# Patient Record
Sex: MALE | Race: WHITE | NOT HISPANIC OR LATINO | Employment: OTHER | URBAN - METROPOLITAN AREA
[De-identification: names, ages, dates, MRNs, and addresses within clinical notes are randomized per-mention and may not be internally consistent; named-entity substitution may affect disease eponyms.]

---

## 2023-01-01 ENCOUNTER — NURSING HOME VISIT (OUTPATIENT)
Dept: POST ACUTE CARE | Facility: EXTERNAL LOCATION | Age: 68
End: 2023-01-01
Payer: MEDICARE

## 2023-01-01 ENCOUNTER — OFFICE VISIT (OUTPATIENT)
Dept: PRIMARY CARE | Facility: CLINIC | Age: 68
End: 2023-01-01
Payer: MEDICARE

## 2023-01-01 ENCOUNTER — TELEPHONE (OUTPATIENT)
Dept: PRIMARY CARE | Facility: CLINIC | Age: 68
End: 2023-01-01
Payer: MEDICARE

## 2023-01-01 ENCOUNTER — DOCUMENTATION (OUTPATIENT)
Dept: PRIMARY CARE | Facility: CLINIC | Age: 68
End: 2023-01-01
Payer: MEDICARE

## 2023-01-01 ENCOUNTER — PATIENT OUTREACH (OUTPATIENT)
Dept: PRIMARY CARE | Facility: CLINIC | Age: 68
End: 2023-01-01
Payer: MEDICARE

## 2023-01-01 ENCOUNTER — APPOINTMENT (OUTPATIENT)
Dept: PRIMARY CARE | Facility: CLINIC | Age: 68
End: 2023-01-01
Payer: MEDICARE

## 2023-01-01 VITALS — OXYGEN SATURATION: 94 % | HEART RATE: 88 BPM | DIASTOLIC BLOOD PRESSURE: 72 MMHG | SYSTOLIC BLOOD PRESSURE: 126 MMHG

## 2023-01-01 VITALS
WEIGHT: 266.4 LBS | SYSTOLIC BLOOD PRESSURE: 122 MMHG | HEART RATE: 73 BPM | BODY MASS INDEX: 40.51 KG/M2 | DIASTOLIC BLOOD PRESSURE: 56 MMHG | OXYGEN SATURATION: 98 %

## 2023-01-01 DIAGNOSIS — K76.82 HEPATIC ENCEPHALOPATHY (MULTI): ICD-10-CM

## 2023-01-01 DIAGNOSIS — M48.062 LUMBAR STENOSIS WITH NEUROGENIC CLAUDICATION: ICD-10-CM

## 2023-01-01 DIAGNOSIS — E11.9 TYPE 2 DIABETES MELLITUS WITHOUT COMPLICATION, WITHOUT LONG-TERM CURRENT USE OF INSULIN (MULTI): ICD-10-CM

## 2023-01-01 DIAGNOSIS — E78.2 COMBINED HYPERLIPIDEMIA: ICD-10-CM

## 2023-01-01 DIAGNOSIS — I10 ESSENTIAL HYPERTENSION, BENIGN: ICD-10-CM

## 2023-01-01 DIAGNOSIS — L29.9 PRURITUS OF SKIN: ICD-10-CM

## 2023-01-01 DIAGNOSIS — K21.9 GERD WITHOUT ESOPHAGITIS: ICD-10-CM

## 2023-01-01 DIAGNOSIS — K76.82 HEPATIC ENCEPHALOPATHY (MULTI): Primary | ICD-10-CM

## 2023-01-01 DIAGNOSIS — M15.9 GENERALIZED OSTEOARTHRITIS OF MULTIPLE SITES: ICD-10-CM

## 2023-01-01 DIAGNOSIS — G47.33 OSA (OBSTRUCTIVE SLEEP APNEA): ICD-10-CM

## 2023-01-01 DIAGNOSIS — R11.0 NAUSEA: ICD-10-CM

## 2023-01-01 DIAGNOSIS — I50.9 HEART FAILURE, UNSPECIFIED HF CHRONICITY, UNSPECIFIED HEART FAILURE TYPE (MULTI): ICD-10-CM

## 2023-01-01 DIAGNOSIS — R18.8 OTHER ASCITES: ICD-10-CM

## 2023-01-01 DIAGNOSIS — E03.9 HYPOTHYROIDISM, UNSPECIFIED TYPE: ICD-10-CM

## 2023-01-01 DIAGNOSIS — E11.8 CONTROLLED TYPE 2 DIABETES MELLITUS WITH COMPLICATION, WITHOUT LONG-TERM CURRENT USE OF INSULIN (MULTI): ICD-10-CM

## 2023-01-01 DIAGNOSIS — L29.9 PRURITUS OF SKIN: Primary | ICD-10-CM

## 2023-01-01 DIAGNOSIS — C22.0 HEPATOCELLULAR CARCINOMA (MULTI): ICD-10-CM

## 2023-01-01 DIAGNOSIS — L29.9 CHRONIC PRURITUS: ICD-10-CM

## 2023-01-01 DIAGNOSIS — K74.60 CIRRHOSIS, NONALCOHOLIC (MULTI): ICD-10-CM

## 2023-01-01 DIAGNOSIS — I50.42 CHRONIC COMBINED SYSTOLIC AND DIASTOLIC CONGESTIVE HEART FAILURE (MULTI): ICD-10-CM

## 2023-01-01 DIAGNOSIS — E11.9 TYPE 2 DIABETES MELLITUS WITHOUT COMPLICATIONS (MULTI): ICD-10-CM

## 2023-01-01 DIAGNOSIS — F32.1 CURRENT MODERATE EPISODE OF MAJOR DEPRESSIVE DISORDER, UNSPECIFIED WHETHER RECURRENT (MULTI): ICD-10-CM

## 2023-01-01 DIAGNOSIS — R07.9 CHEST PAIN, UNSPECIFIED TYPE: ICD-10-CM

## 2023-01-01 DIAGNOSIS — M62.81 MUSCLE WEAKNESS (GENERALIZED): Primary | ICD-10-CM

## 2023-01-01 DIAGNOSIS — E11.9 TYPE 2 DIABETES MELLITUS WITHOUT COMPLICATION, UNSPECIFIED WHETHER LONG TERM INSULIN USE (MULTI): ICD-10-CM

## 2023-01-01 DIAGNOSIS — E66.01 MORBID OBESITY WITH BODY MASS INDEX (BMI) OF 40.0 OR HIGHER (MULTI): ICD-10-CM

## 2023-01-01 DIAGNOSIS — N39.0 URINARY TRACT INFECTION WITHOUT HEMATURIA, SITE UNSPECIFIED: ICD-10-CM

## 2023-01-01 DIAGNOSIS — K65.2 SBP (SPONTANEOUS BACTERIAL PERITONITIS) (MULTI): ICD-10-CM

## 2023-01-01 DIAGNOSIS — L73.2 HYDRADENITIS: Primary | ICD-10-CM

## 2023-01-01 DIAGNOSIS — F32.1 MAJOR DEPRESSIVE DISORDER, SINGLE EPISODE, MODERATE (MULTI): ICD-10-CM

## 2023-01-01 DIAGNOSIS — I50.43 ACUTE ON CHRONIC COMBINED SYSTOLIC AND DIASTOLIC CONGESTIVE HEART FAILURE (MULTI): ICD-10-CM

## 2023-01-01 DIAGNOSIS — L30.4 INTERTRIGO: ICD-10-CM

## 2023-01-01 DIAGNOSIS — K65.2 SPONTANEOUS BACTERIAL PERITONITIS (MULTI): ICD-10-CM

## 2023-01-01 DIAGNOSIS — I50.42 CHRONIC COMBINED SYSTOLIC AND DIASTOLIC CONGESTIVE HEART FAILURE (MULTI): Primary | ICD-10-CM

## 2023-01-01 LAB
ALANINE AMINOTRANSFERASE (SGPT) (U/L) IN SER/PLAS: 35 U/L (ref 10–52)
ALANINE AMINOTRANSFERASE (SGPT) (U/L) IN SER/PLAS: 37 U/L (ref 10–52)
ALANINE AMINOTRANSFERASE (SGPT) (U/L) IN SER/PLAS: 43 U/L (ref 10–52)
ALANINE AMINOTRANSFERASE (SGPT) (U/L) IN SER/PLAS: 44 U/L (ref 10–52)
ALANINE AMINOTRANSFERASE (SGPT) (U/L) IN SER/PLAS: 44 U/L (ref 10–52)
ALANINE AMINOTRANSFERASE (SGPT) (U/L) IN SER/PLAS: 45 U/L (ref 10–52)
ALANINE AMINOTRANSFERASE (SGPT) (U/L) IN SER/PLAS: 46 U/L (ref 10–52)
ALANINE AMINOTRANSFERASE (SGPT) (U/L) IN SER/PLAS: 77 U/L (ref 10–52)
ALANINE AMINOTRANSFERASE (SGPT) (U/L) IN SER/PLAS: NORMAL
ALBUMIN (G/DL) IN SER/PLAS: 2.1 G/DL (ref 3.4–5)
ALBUMIN (G/DL) IN SER/PLAS: 2.2 G/DL (ref 3.4–5)
ALBUMIN (G/DL) IN SER/PLAS: 2.4 G/DL (ref 3.4–5)
ALBUMIN (G/DL) IN SER/PLAS: 2.6 G/DL (ref 3.4–5)
ALBUMIN (G/DL) IN SER/PLAS: ABNORMAL G/DL (ref 3.4–5)
ALBUMIN (G/DL) IN SER/PLAS: NORMAL
ALKALINE PHOSPHATASE (U/L) IN SER/PLAS: 179 U/L (ref 33–136)
ALKALINE PHOSPHATASE (U/L) IN SER/PLAS: 182 U/L (ref 33–136)
ALKALINE PHOSPHATASE (U/L) IN SER/PLAS: 197 U/L (ref 33–136)
ALKALINE PHOSPHATASE (U/L) IN SER/PLAS: 199 U/L (ref 33–136)
ALKALINE PHOSPHATASE (U/L) IN SER/PLAS: 230 U/L (ref 33–136)
ALKALINE PHOSPHATASE (U/L) IN SER/PLAS: 231 U/L (ref 33–136)
ALKALINE PHOSPHATASE (U/L) IN SER/PLAS: 247 U/L (ref 33–136)
ALKALINE PHOSPHATASE (U/L) IN SER/PLAS: 369 U/L (ref 33–136)
ALKALINE PHOSPHATASE (U/L) IN SER/PLAS: NORMAL
AMMONIA (UMOL/L) IN PLASMA: 123 UMOL/L
AMMONIA (UMOL/L) IN PLASMA: 147 UMOL/L
AMMONIA (UMOL/L) IN PLASMA: 298 UMOL/L
AMMONIA (UMOL/L) IN PLASMA: 60 UMOL/L
AMMONIA (UMOL/L) IN PLASMA: 79 UMOL/L
AMMONIA (UMOL/L) IN PLASMA: 84 UMOL/L
ANION GAP IN SER/PLAS: 11 MMOL/L (ref 10–20)
ANION GAP IN SER/PLAS: 13 MMOL/L (ref 10–20)
ANION GAP IN SER/PLAS: 13 MMOL/L (ref 10–20)
ANION GAP IN SER/PLAS: 15 MMOL/L (ref 10–20)
ANION GAP IN SER/PLAS: 15 MMOL/L (ref 10–20)
ANION GAP IN SER/PLAS: 16 MMOL/L (ref 10–20)
ANION GAP IN SER/PLAS: 16 MMOL/L (ref 10–20)
ANION GAP IN SER/PLAS: 18 MMOL/L (ref 10–20)
ANION GAP IN SER/PLAS: ABNORMAL MMOL/L (ref 10–20)
ANION GAP IN SER/PLAS: NORMAL
ASPARTATE AMINOTRANSFERASE (SGOT) (U/L) IN SER/PLAS: 68 U/L (ref 9–39)
ASPARTATE AMINOTRANSFERASE (SGOT) (U/L) IN SER/PLAS: 73 U/L (ref 9–39)
ASPARTATE AMINOTRANSFERASE (SGOT) (U/L) IN SER/PLAS: 75 U/L (ref 9–39)
ASPARTATE AMINOTRANSFERASE (SGOT) (U/L) IN SER/PLAS: 80 U/L (ref 9–39)
ASPARTATE AMINOTRANSFERASE (SGOT) (U/L) IN SER/PLAS: 82 U/L (ref 9–39)
ASPARTATE AMINOTRANSFERASE (SGOT) (U/L) IN SER/PLAS: 83 U/L (ref 9–39)
ASPARTATE AMINOTRANSFERASE (SGOT) (U/L) IN SER/PLAS: 88 U/L (ref 9–39)
ASPARTATE AMINOTRANSFERASE (SGOT) (U/L) IN SER/PLAS: 99 U/L (ref 9–39)
ASPARTATE AMINOTRANSFERASE (SGOT) (U/L) IN SER/PLAS: NORMAL
BASOPHILS (10*3/UL) IN BLOOD BY AUTOMATED COUNT: 0.11 X10E9/L (ref 0–0.1)
BASOPHILS (10*3/UL) IN BLOOD BY AUTOMATED COUNT: NORMAL
BASOPHILS/100 LEUKOCYTES IN BLOOD BY AUTOMATED COUNT: 1.2 % (ref 0–2)
BASOPHILS/100 LEUKOCYTES IN BLOOD BY AUTOMATED COUNT: NORMAL
BILIRUBIN DIRECT (MG/DL) IN SER/PLAS: 3.9 MG/DL (ref 0–0.3)
BILIRUBIN TOTAL (MG/DL) IN SER/PLAS: 1.1 MG/DL (ref 0–1.2)
BILIRUBIN TOTAL (MG/DL) IN SER/PLAS: 1.4 MG/DL (ref 0–1.2)
BILIRUBIN TOTAL (MG/DL) IN SER/PLAS: 5.5 MG/DL (ref 0–1.2)
BILIRUBIN TOTAL (MG/DL) IN SER/PLAS: 6.2 MG/DL (ref 0–1.2)
BILIRUBIN TOTAL (MG/DL) IN SER/PLAS: 6.4 MG/DL (ref 0–1.2)
BILIRUBIN TOTAL (MG/DL) IN SER/PLAS: 6.5 MG/DL (ref 0–1.2)
BILIRUBIN TOTAL (MG/DL) IN SER/PLAS: 6.6 MG/DL (ref 0–1.2)
BILIRUBIN TOTAL (MG/DL) IN SER/PLAS: 7 MG/DL (ref 0–1.2)
BILIRUBIN TOTAL (MG/DL) IN SER/PLAS: NORMAL
CALCIUM (MG/DL) IN SER/PLAS: 7.8 MG/DL (ref 8.6–10.3)
CALCIUM (MG/DL) IN SER/PLAS: 7.9 MG/DL (ref 8.6–10.3)
CALCIUM (MG/DL) IN SER/PLAS: 7.9 MG/DL (ref 8.6–10.3)
CALCIUM (MG/DL) IN SER/PLAS: 8 MG/DL (ref 8.6–10.3)
CALCIUM (MG/DL) IN SER/PLAS: 8 MG/DL (ref 8.6–10.3)
CALCIUM (MG/DL) IN SER/PLAS: 8.1 MG/DL (ref 8.6–10.3)
CALCIUM (MG/DL) IN SER/PLAS: 8.2 MG/DL (ref 8.6–10.3)
CALCIUM (MG/DL) IN SER/PLAS: 8.3 MG/DL (ref 8.6–10.3)
CALCIUM (MG/DL) IN SER/PLAS: ABNORMAL MG/DL (ref 8.6–10.3)
CALCIUM (MG/DL) IN SER/PLAS: NORMAL
CARBON DIOXIDE, TOTAL (MMOL/L) IN SER/PLAS: 13 MMOL/L (ref 21–32)
CARBON DIOXIDE, TOTAL (MMOL/L) IN SER/PLAS: 14 MMOL/L (ref 21–32)
CARBON DIOXIDE, TOTAL (MMOL/L) IN SER/PLAS: 16 MMOL/L (ref 21–32)
CARBON DIOXIDE, TOTAL (MMOL/L) IN SER/PLAS: 17 MMOL/L (ref 21–32)
CARBON DIOXIDE, TOTAL (MMOL/L) IN SER/PLAS: 18 MMOL/L (ref 21–32)
CARBON DIOXIDE, TOTAL (MMOL/L) IN SER/PLAS: 21 MMOL/L (ref 21–32)
CARBON DIOXIDE, TOTAL (MMOL/L) IN SER/PLAS: ABNORMAL MMOL/L (ref 21–32)
CARBON DIOXIDE, TOTAL (MMOL/L) IN SER/PLAS: NORMAL
CHLORIDE (MMOL/L) IN SER/PLAS: 101 MMOL/L (ref 98–107)
CHLORIDE (MMOL/L) IN SER/PLAS: 106 MMOL/L (ref 98–107)
CHLORIDE (MMOL/L) IN SER/PLAS: 106 MMOL/L (ref 98–107)
CHLORIDE (MMOL/L) IN SER/PLAS: 107 MMOL/L (ref 98–107)
CHLORIDE (MMOL/L) IN SER/PLAS: 107 MMOL/L (ref 98–107)
CHLORIDE (MMOL/L) IN SER/PLAS: 111 MMOL/L (ref 98–107)
CHLORIDE (MMOL/L) IN SER/PLAS: 111 MMOL/L (ref 98–107)
CHLORIDE (MMOL/L) IN SER/PLAS: 112 MMOL/L (ref 98–107)
CHLORIDE (MMOL/L) IN SER/PLAS: ABNORMAL MMOL/L (ref 98–107)
CHLORIDE (MMOL/L) IN SER/PLAS: NORMAL
CREATININE (MG/DL) IN SER/PLAS: 1.02 MG/DL (ref 0.5–1.3)
CREATININE (MG/DL) IN SER/PLAS: 1.25 MG/DL (ref 0.5–1.3)
CREATININE (MG/DL) IN SER/PLAS: 4.05 MG/DL (ref 0.5–1.3)
CREATININE (MG/DL) IN SER/PLAS: 4.44 MG/DL (ref 0.5–1.3)
CREATININE (MG/DL) IN SER/PLAS: 4.55 MG/DL (ref 0.5–1.3)
CREATININE (MG/DL) IN SER/PLAS: 4.56 MG/DL (ref 0.5–1.3)
CREATININE (MG/DL) IN SER/PLAS: 4.57 MG/DL (ref 0.5–1.3)
CREATININE (MG/DL) IN SER/PLAS: 4.63 MG/DL (ref 0.5–1.3)
CREATININE (MG/DL) IN SER/PLAS: ABNORMAL MG/DL (ref 0.5–1.3)
CREATININE (MG/DL) IN SER/PLAS: NORMAL
EOSINOPHILS (10*3/UL) IN BLOOD BY AUTOMATED COUNT: 0.33 X10E9/L (ref 0–0.7)
EOSINOPHILS (10*3/UL) IN BLOOD BY AUTOMATED COUNT: NORMAL
EOSINOPHILS/100 LEUKOCYTES IN BLOOD BY AUTOMATED COUNT: 3.7 % (ref 0–6)
EOSINOPHILS/100 LEUKOCYTES IN BLOOD BY AUTOMATED COUNT: NORMAL
ERYTHROCYTE DISTRIBUTION WIDTH (RATIO) BY AUTOMATED COUNT: 18.9 % (ref 11.5–14.5)
ERYTHROCYTE DISTRIBUTION WIDTH (RATIO) BY AUTOMATED COUNT: 19 % (ref 11.5–14.5)
ERYTHROCYTE DISTRIBUTION WIDTH (RATIO) BY AUTOMATED COUNT: 20.7 % (ref 11.5–14.5)
ERYTHROCYTE DISTRIBUTION WIDTH (RATIO) BY AUTOMATED COUNT: 23.2 % (ref 11.5–14.5)
ERYTHROCYTE DISTRIBUTION WIDTH (RATIO) BY AUTOMATED COUNT: 23.3 % (ref 11.5–14.5)
ERYTHROCYTE DISTRIBUTION WIDTH (RATIO) BY AUTOMATED COUNT: 23.4 % (ref 11.5–14.5)
ERYTHROCYTE DISTRIBUTION WIDTH (RATIO) BY AUTOMATED COUNT: 23.4 % (ref 11.5–14.5)
ERYTHROCYTE DISTRIBUTION WIDTH (RATIO) BY AUTOMATED COUNT: 23.5 % (ref 11.5–14.5)
ERYTHROCYTE DISTRIBUTION WIDTH (RATIO) BY AUTOMATED COUNT: NORMAL
ERYTHROCYTE MEAN CORPUSCULAR HEMOGLOBIN CONCENTRATION (G/DL) BY AUTOMATED: 31.2 G/DL (ref 32–36)
ERYTHROCYTE MEAN CORPUSCULAR HEMOGLOBIN CONCENTRATION (G/DL) BY AUTOMATED: 31.3 G/DL (ref 32–36)
ERYTHROCYTE MEAN CORPUSCULAR HEMOGLOBIN CONCENTRATION (G/DL) BY AUTOMATED: 31.4 G/DL (ref 32–36)
ERYTHROCYTE MEAN CORPUSCULAR HEMOGLOBIN CONCENTRATION (G/DL) BY AUTOMATED: 31.4 G/DL (ref 32–36)
ERYTHROCYTE MEAN CORPUSCULAR HEMOGLOBIN CONCENTRATION (G/DL) BY AUTOMATED: 31.6 G/DL (ref 32–36)
ERYTHROCYTE MEAN CORPUSCULAR HEMOGLOBIN CONCENTRATION (G/DL) BY AUTOMATED: 32.2 G/DL (ref 32–36)
ERYTHROCYTE MEAN CORPUSCULAR HEMOGLOBIN CONCENTRATION (G/DL) BY AUTOMATED: NORMAL
ERYTHROCYTE MEAN CORPUSCULAR VOLUME (FL) BY AUTOMATED COUNT: 85 FL (ref 80–100)
ERYTHROCYTE MEAN CORPUSCULAR VOLUME (FL) BY AUTOMATED COUNT: 88 FL (ref 80–100)
ERYTHROCYTE MEAN CORPUSCULAR VOLUME (FL) BY AUTOMATED COUNT: 89 FL (ref 80–100)
ERYTHROCYTE MEAN CORPUSCULAR VOLUME (FL) BY AUTOMATED COUNT: 90 FL (ref 80–100)
ERYTHROCYTE MEAN CORPUSCULAR VOLUME (FL) BY AUTOMATED COUNT: 91 FL (ref 80–100)
ERYTHROCYTE MEAN CORPUSCULAR VOLUME (FL) BY AUTOMATED COUNT: 91 FL (ref 80–100)
ERYTHROCYTE MEAN CORPUSCULAR VOLUME (FL) BY AUTOMATED COUNT: NORMAL
ERYTHROCYTES (10*6/UL) IN BLOOD BY AUTOMATED COUNT: 2.89 X10E12/L (ref 4.5–5.9)
ERYTHROCYTES (10*6/UL) IN BLOOD BY AUTOMATED COUNT: 2.93 X10E12/L (ref 4.5–5.9)
ERYTHROCYTES (10*6/UL) IN BLOOD BY AUTOMATED COUNT: 2.96 X10E12/L (ref 4.5–5.9)
ERYTHROCYTES (10*6/UL) IN BLOOD BY AUTOMATED COUNT: 3 X10E12/L (ref 4.5–5.9)
ERYTHROCYTES (10*6/UL) IN BLOOD BY AUTOMATED COUNT: 3.14 X10E12/L (ref 4.5–5.9)
ERYTHROCYTES (10*6/UL) IN BLOOD BY AUTOMATED COUNT: 3.14 X10E12/L (ref 4.5–5.9)
ERYTHROCYTES (10*6/UL) IN BLOOD BY AUTOMATED COUNT: 4.06 X10E12/L (ref 4.5–5.9)
ERYTHROCYTES (10*6/UL) IN BLOOD BY AUTOMATED COUNT: 4.11 X10E12/L (ref 4.5–5.9)
ERYTHROCYTES (10*6/UL) IN BLOOD BY AUTOMATED COUNT: NORMAL
GFR FEMALE: NORMAL
GFR MALE: 13 ML/MIN/1.73M2
GFR MALE: 14 ML/MIN/1.73M2
GFR MALE: 15 ML/MIN/1.73M2
GFR MALE: 62 ML/MIN/1.73M2
GFR MALE: 79 ML/MIN/1.73M2
GFR MALE: ABNORMAL ML/MIN/1.73M2
GFR MALE: NORMAL
GLUCOSE (MG/DL) IN SER/PLAS: 118 MG/DL (ref 74–99)
GLUCOSE (MG/DL) IN SER/PLAS: 169 MG/DL (ref 74–99)
GLUCOSE (MG/DL) IN SER/PLAS: 36 MG/DL (ref 74–99)
GLUCOSE (MG/DL) IN SER/PLAS: 45 MG/DL (ref 74–99)
GLUCOSE (MG/DL) IN SER/PLAS: 46 MG/DL (ref 74–99)
GLUCOSE (MG/DL) IN SER/PLAS: 48 MG/DL (ref 74–99)
GLUCOSE (MG/DL) IN SER/PLAS: 53 MG/DL (ref 74–99)
GLUCOSE (MG/DL) IN SER/PLAS: 58 MG/DL (ref 74–99)
GLUCOSE (MG/DL) IN SER/PLAS: ABNORMAL MG/DL (ref 74–99)
GLUCOSE (MG/DL) IN SER/PLAS: NORMAL
HEMATOCRIT (%) IN BLOOD BY AUTOMATED COUNT: 24.5 % (ref 41–52)
HEMATOCRIT (%) IN BLOOD BY AUTOMATED COUNT: 26.2 % (ref 41–52)
HEMATOCRIT (%) IN BLOOD BY AUTOMATED COUNT: 26.4 % (ref 41–52)
HEMATOCRIT (%) IN BLOOD BY AUTOMATED COUNT: 27.2 % (ref 41–52)
HEMATOCRIT (%) IN BLOOD BY AUTOMATED COUNT: 28.3 % (ref 41–52)
HEMATOCRIT (%) IN BLOOD BY AUTOMATED COUNT: 28.5 % (ref 41–52)
HEMATOCRIT (%) IN BLOOD BY AUTOMATED COUNT: 35.9 % (ref 41–52)
HEMATOCRIT (%) IN BLOOD BY AUTOMATED COUNT: 36.7 % (ref 41–52)
HEMATOCRIT (%) IN BLOOD BY AUTOMATED COUNT: NORMAL
HEMOGLOBIN (G/DL) IN BLOOD: 11.2 G/DL (ref 13.5–17.5)
HEMOGLOBIN (G/DL) IN BLOOD: 11.5 G/DL (ref 13.5–17.5)
HEMOGLOBIN (G/DL) IN BLOOD: 7.9 G/DL (ref 13.5–17.5)
HEMOGLOBIN (G/DL) IN BLOOD: 8.2 G/DL (ref 13.5–17.5)
HEMOGLOBIN (G/DL) IN BLOOD: 8.3 G/DL (ref 13.5–17.5)
HEMOGLOBIN (G/DL) IN BLOOD: 8.5 G/DL (ref 13.5–17.5)
HEMOGLOBIN (G/DL) IN BLOOD: 8.9 G/DL (ref 13.5–17.5)
HEMOGLOBIN (G/DL) IN BLOOD: 9 G/DL (ref 13.5–17.5)
HEMOGLOBIN (G/DL) IN BLOOD: NORMAL
IMMATURE GRANULOCYTES/100 LEUKOCYTES IN BLOOD BY AUTOMATED COUNT: 5.3 % (ref 0–0.9)
IMMATURE GRANULOCYTES/100 LEUKOCYTES IN BLOOD BY AUTOMATED COUNT: NORMAL
LEUKOCYTES (10*3/UL) IN BLOOD BY AUTOMATED COUNT: 10.7 X10E9/L (ref 4.4–11.3)
LEUKOCYTES (10*3/UL) IN BLOOD BY AUTOMATED COUNT: 4.4 X10E9/L (ref 4.4–11.3)
LEUKOCYTES (10*3/UL) IN BLOOD BY AUTOMATED COUNT: 6.2 X10E9/L (ref 4.4–11.3)
LEUKOCYTES (10*3/UL) IN BLOOD BY AUTOMATED COUNT: 6.5 X10E9/L (ref 4.4–11.3)
LEUKOCYTES (10*3/UL) IN BLOOD BY AUTOMATED COUNT: 7.2 X10E9/L (ref 4.4–11.3)
LEUKOCYTES (10*3/UL) IN BLOOD BY AUTOMATED COUNT: 7.7 X10E9/L (ref 4.4–11.3)
LEUKOCYTES (10*3/UL) IN BLOOD BY AUTOMATED COUNT: 8.8 X10E9/L (ref 4.4–11.3)
LEUKOCYTES (10*3/UL) IN BLOOD BY AUTOMATED COUNT: 8.9 X10E9/L (ref 4.4–11.3)
LEUKOCYTES (10*3/UL) IN BLOOD BY AUTOMATED COUNT: NORMAL
LYMPHOCYTES (10*3/UL) IN BLOOD BY AUTOMATED COUNT: 0.6 X10E9/L (ref 1.2–4.8)
LYMPHOCYTES (10*3/UL) IN BLOOD BY AUTOMATED COUNT: NORMAL
LYMPHOCYTES/100 LEUKOCYTES IN BLOOD BY AUTOMATED COUNT: 6.7 % (ref 13–44)
LYMPHOCYTES/100 LEUKOCYTES IN BLOOD BY AUTOMATED COUNT: NORMAL
MAGNESIUM (MG/DL) IN SER/PLAS: 1.84 MG/DL (ref 1.6–2.4)
MANUAL DIFFERENTIAL Y/N: NORMAL
MONOCYTES (10*3/UL) IN BLOOD BY AUTOMATED COUNT: 0.77 X10E9/L (ref 0.1–1)
MONOCYTES (10*3/UL) IN BLOOD BY AUTOMATED COUNT: NORMAL
MONOCYTES/100 LEUKOCYTES IN BLOOD BY AUTOMATED COUNT: 8.6 % (ref 2–10)
MONOCYTES/100 LEUKOCYTES IN BLOOD BY AUTOMATED COUNT: NORMAL
NEUTROPHILS (10*3/UL) IN BLOOD BY AUTOMATED COUNT: 6.63 X10E9/L (ref 1.2–7.7)
NEUTROPHILS (10*3/UL) IN BLOOD BY AUTOMATED COUNT: NORMAL
NEUTROPHILS/100 LEUKOCYTES IN BLOOD BY AUTOMATED COUNT: 74.5 % (ref 40–80)
NEUTROPHILS/100 LEUKOCYTES IN BLOOD BY AUTOMATED COUNT: NORMAL
NRBC (PER 100 WBCS) BY AUTOMATED COUNT: NORMAL
PHOSPHATE (MG/DL) IN SER/PLAS: 6.1 MG/DL (ref 2.5–4.9)
PHOSPHATE (MG/DL) IN SER/PLAS: 6.3 MG/DL (ref 2.5–4.9)
PHOSPHATE (MG/DL) IN SER/PLAS: ABNORMAL MG/DL (ref 2.5–4.9)
PLATELETS (10*3/UL) IN BLOOD AUTOMATED COUNT: 100 X10E9/L (ref 150–450)
PLATELETS (10*3/UL) IN BLOOD AUTOMATED COUNT: 114 X10E9/L (ref 150–450)
PLATELETS (10*3/UL) IN BLOOD AUTOMATED COUNT: 62 X10E9/L (ref 150–450)
PLATELETS (10*3/UL) IN BLOOD AUTOMATED COUNT: 73 X10E9/L (ref 150–450)
PLATELETS (10*3/UL) IN BLOOD AUTOMATED COUNT: 74 X10E9/L (ref 150–450)
PLATELETS (10*3/UL) IN BLOOD AUTOMATED COUNT: 75 X10E9/L (ref 150–450)
PLATELETS (10*3/UL) IN BLOOD AUTOMATED COUNT: 84 X10E9/L (ref 150–450)
PLATELETS (10*3/UL) IN BLOOD AUTOMATED COUNT: 98 X10E9/L (ref 150–450)
PLATELETS (10*3/UL) IN BLOOD AUTOMATED COUNT: NORMAL
POTASSIUM (MMOL/L) IN SER/PLAS: 4.2 MMOL/L (ref 3.5–5.3)
POTASSIUM (MMOL/L) IN SER/PLAS: 4.3 MMOL/L (ref 3.5–5.3)
POTASSIUM (MMOL/L) IN SER/PLAS: 4.4 MMOL/L (ref 3.5–5.3)
POTASSIUM (MMOL/L) IN SER/PLAS: 4.8 MMOL/L (ref 3.5–5.3)
POTASSIUM (MMOL/L) IN SER/PLAS: 5 MMOL/L (ref 3.5–5.3)
POTASSIUM (MMOL/L) IN SER/PLAS: 5 MMOL/L (ref 3.5–5.3)
POTASSIUM (MMOL/L) IN SER/PLAS: ABNORMAL MMOL/L (ref 3.5–5.3)
POTASSIUM (MMOL/L) IN SER/PLAS: NORMAL
PROTEIN TOTAL: 5.6 G/DL (ref 6.4–8.2)
PROTEIN TOTAL: 5.6 G/DL (ref 6.4–8.2)
PROTEIN TOTAL: 5.7 G/DL (ref 6.4–8.2)
PROTEIN TOTAL: 5.9 G/DL (ref 6.4–8.2)
PROTEIN TOTAL: 6 G/DL (ref 6.4–8.2)
PROTEIN TOTAL: 6.3 G/DL (ref 6.4–8.2)
PROTEIN TOTAL: NORMAL
RBC MORPHOLOGY IN BLOOD: NORMAL
SODIUM (MMOL/L) IN SER/PLAS: 131 MMOL/L (ref 136–145)
SODIUM (MMOL/L) IN SER/PLAS: 133 MMOL/L (ref 136–145)
SODIUM (MMOL/L) IN SER/PLAS: 134 MMOL/L (ref 136–145)
SODIUM (MMOL/L) IN SER/PLAS: 135 MMOL/L (ref 136–145)
SODIUM (MMOL/L) IN SER/PLAS: 135 MMOL/L (ref 136–145)
SODIUM (MMOL/L) IN SER/PLAS: 136 MMOL/L (ref 136–145)
SODIUM (MMOL/L) IN SER/PLAS: 136 MMOL/L (ref 136–145)
SODIUM (MMOL/L) IN SER/PLAS: 138 MMOL/L (ref 136–145)
SODIUM (MMOL/L) IN SER/PLAS: ABNORMAL MMOL/L (ref 136–145)
SODIUM (MMOL/L) IN SER/PLAS: NORMAL
UREA NITROGEN (MG/DL) IN SER/PLAS: 17 MG/DL (ref 6–23)
UREA NITROGEN (MG/DL) IN SER/PLAS: 18 MG/DL (ref 6–23)
UREA NITROGEN (MG/DL) IN SER/PLAS: 64 MG/DL (ref 6–23)
UREA NITROGEN (MG/DL) IN SER/PLAS: 68 MG/DL (ref 6–23)
UREA NITROGEN (MG/DL) IN SER/PLAS: 74 MG/DL (ref 6–23)
UREA NITROGEN (MG/DL) IN SER/PLAS: 78 MG/DL (ref 6–23)
UREA NITROGEN (MG/DL) IN SER/PLAS: 80 MG/DL (ref 6–23)
UREA NITROGEN (MG/DL) IN SER/PLAS: 80 MG/DL (ref 6–23)
UREA NITROGEN (MG/DL) IN SER/PLAS: ABNORMAL MG/DL (ref 6–23)
UREA NITROGEN (MG/DL) IN SER/PLAS: NORMAL

## 2023-01-01 PROCEDURE — 99309 SBSQ NF CARE MODERATE MDM 30: CPT | Performed by: NURSE PRACTITIONER

## 2023-01-01 PROCEDURE — 99214 OFFICE O/P EST MOD 30 MIN: CPT | Performed by: FAMILY MEDICINE

## 2023-01-01 PROCEDURE — 99310 SBSQ NF CARE HIGH MDM 45: CPT | Performed by: NURSE PRACTITIONER

## 2023-01-01 PROCEDURE — 99305 1ST NF CARE MODERATE MDM 35: CPT | Performed by: FAMILY MEDICINE

## 2023-01-01 PROCEDURE — 99309 SBSQ NF CARE MODERATE MDM 30: CPT | Performed by: FAMILY MEDICINE

## 2023-01-01 PROCEDURE — 3078F DIAST BP <80 MM HG: CPT | Performed by: FAMILY MEDICINE

## 2023-01-01 PROCEDURE — 1036F TOBACCO NON-USER: CPT | Performed by: FAMILY MEDICINE

## 2023-01-01 PROCEDURE — 3074F SYST BP LT 130 MM HG: CPT | Performed by: FAMILY MEDICINE

## 2023-01-01 PROCEDURE — 99495 TRANSJ CARE MGMT MOD F2F 14D: CPT | Performed by: FAMILY MEDICINE

## 2023-01-01 PROCEDURE — 1159F MED LIST DOCD IN RCRD: CPT | Performed by: FAMILY MEDICINE

## 2023-01-01 PROCEDURE — 99316 NF DSCHRG MGMT 30 MIN+: CPT | Performed by: FAMILY MEDICINE

## 2023-01-01 PROCEDURE — 1160F RVW MEDS BY RX/DR IN RCRD: CPT | Performed by: FAMILY MEDICINE

## 2023-01-01 PROCEDURE — 3008F BODY MASS INDEX DOCD: CPT | Performed by: FAMILY MEDICINE

## 2023-01-01 PROCEDURE — 99308 SBSQ NF CARE LOW MDM 20: CPT | Performed by: FAMILY MEDICINE

## 2023-01-01 RX ORDER — MECLIZINE HCL 12.5 MG 12.5 MG/1
12.5 TABLET ORAL DAILY
COMMUNITY

## 2023-01-01 RX ORDER — GLIPIZIDE 10 MG/1
TABLET, FILM COATED, EXTENDED RELEASE ORAL
COMMUNITY

## 2023-01-01 RX ORDER — GLIPIZIDE 5 MG/1
TABLET ORAL
Qty: 180 TABLET | Refills: 1 | Status: SHIPPED | OUTPATIENT
Start: 2023-01-01 | End: 2023-06-21 | Stop reason: CLARIF

## 2023-01-01 RX ORDER — HYDROXYZINE HYDROCHLORIDE 25 MG/1
25 TABLET, FILM COATED ORAL 3 TIMES DAILY
Qty: 90 TABLET | Refills: 0 | Status: SHIPPED | OUTPATIENT
Start: 2023-01-01 | End: 2023-01-01

## 2023-01-01 RX ORDER — LEVOTHYROXINE SODIUM 175 UG/1
TABLET ORAL
COMMUNITY
Start: 2022-01-01

## 2023-01-01 RX ORDER — LACTULOSE 10 G/15ML
SOLUTION ORAL; RECTAL
COMMUNITY
Start: 2021-08-18

## 2023-01-01 RX ORDER — NEOMYCIN SULFATE 500 MG/1
TABLET ORAL
COMMUNITY

## 2023-01-01 RX ORDER — DOXYCYCLINE 100 MG/1
100 CAPSULE ORAL 2 TIMES DAILY
Qty: 90 CAPSULE | Refills: 0 | Status: SHIPPED | OUTPATIENT
Start: 2023-01-01 | End: 2023-01-01

## 2023-01-01 RX ORDER — LACTULOSE 10 G/15ML
30 SOLUTION ORAL; RECTAL 4 TIMES DAILY
Qty: 3480 ML | Refills: 0 | COMMUNITY
Start: 2023-01-01 | End: 2023-01-01

## 2023-01-01 RX ORDER — CHOLESTYRAMINE 4 G/9G
POWDER, FOR SUSPENSION ORAL 3 TIMES DAILY
COMMUNITY
Start: 2023-01-01

## 2023-01-01 RX ORDER — BUPROPION HYDROCHLORIDE 150 MG/1
TABLET ORAL
Qty: 90 TABLET | Refills: 0 | Status: SHIPPED | OUTPATIENT
Start: 2023-01-01 | End: 2023-06-21 | Stop reason: CLARIF

## 2023-01-01 RX ORDER — TORSEMIDE 20 MG/1
TABLET ORAL
COMMUNITY
Start: 2023-01-01

## 2023-01-01 RX ORDER — SPIRONOLACTONE 25 MG/1
TABLET ORAL
Qty: 90 TABLET | Refills: 1 | Status: SHIPPED | OUTPATIENT
Start: 2023-01-01 | End: 2023-06-21 | Stop reason: CLARIF

## 2023-01-01 RX ORDER — MUPIROCIN 20 MG/G
OINTMENT TOPICAL
COMMUNITY

## 2023-01-01 RX ORDER — FUROSEMIDE 20 MG/1
20 TABLET ORAL EVERY OTHER DAY
COMMUNITY
Start: 2023-01-01

## 2023-01-01 RX ORDER — SUCRALFATE 1 G/1
TABLET ORAL
COMMUNITY

## 2023-01-01 RX ORDER — PREDNISONE 5 MG/1
1 TABLET ORAL 2 TIMES DAILY
COMMUNITY
Start: 2023-01-01

## 2023-01-01 RX ORDER — MIRTAZAPINE 45 MG/1
45 TABLET, FILM COATED ORAL NIGHTLY
Qty: 90 TABLET | Refills: 3 | Status: SHIPPED | OUTPATIENT
Start: 2023-01-01 | End: 2023-06-21 | Stop reason: CLARIF

## 2023-01-01 RX ORDER — CARVEDILOL 3.12 MG/1
3.12 TABLET ORAL
Qty: 180 TABLET | Refills: 3 | Status: SHIPPED | OUTPATIENT
Start: 2023-01-01 | End: 2023-06-21 | Stop reason: CLARIF

## 2023-01-01 RX ORDER — TURMERIC 400 MG
CAPSULE ORAL
COMMUNITY

## 2023-01-01 RX ORDER — NORTRIPTYLINE HYDROCHLORIDE 25 MG/1
CAPSULE ORAL
COMMUNITY

## 2023-01-01 RX ORDER — SUCRALFATE 1 G/10ML
1 SUSPENSION ORAL
COMMUNITY
End: 2023-01-01 | Stop reason: ALTCHOICE

## 2023-01-01 RX ORDER — PANTOPRAZOLE SODIUM 40 MG/1
TABLET, DELAYED RELEASE ORAL
COMMUNITY

## 2023-01-01 RX ORDER — BUPROPION HYDROCHLORIDE 150 MG/1
TABLET ORAL
COMMUNITY
Start: 2023-01-01 | End: 2023-01-01

## 2023-01-01 RX ORDER — HYDROXYZINE HYDROCHLORIDE 25 MG/1
TABLET, FILM COATED ORAL
Qty: 90 TABLET | Refills: 0 | Status: SHIPPED | OUTPATIENT
Start: 2023-01-01 | End: 2023-01-01

## 2023-01-01 RX ORDER — VENLAFAXINE HYDROCHLORIDE 37.5 MG/1
CAPSULE, EXTENDED RELEASE ORAL
COMMUNITY

## 2023-01-01 RX ORDER — MIRTAZAPINE 30 MG/1
45 TABLET, FILM COATED ORAL NIGHTLY
Qty: 135 TABLET | Refills: 3 | Status: SHIPPED | OUTPATIENT
Start: 2023-01-01 | End: 2023-01-01 | Stop reason: ALTCHOICE

## 2023-01-01 RX ORDER — OXYCODONE HYDROCHLORIDE 10 MG/1
TABLET ORAL
COMMUNITY
Start: 2023-01-01

## 2023-01-01 RX ORDER — HUMAN INSULIN 100 [IU]/ML
INJECTION, SUSPENSION SUBCUTANEOUS
COMMUNITY

## 2023-01-01 RX ORDER — SERTRALINE HYDROCHLORIDE 50 MG/1
TABLET, FILM COATED ORAL
COMMUNITY

## 2023-01-01 RX ORDER — ZOLPIDEM TARTRATE 10 MG/1
TABLET ORAL
COMMUNITY

## 2023-01-01 RX ORDER — ONDANSETRON HYDROCHLORIDE 8 MG/1
TABLET, FILM COATED ORAL
COMMUNITY
Start: 2023-01-01

## 2023-01-01 RX ORDER — SPIRONOLACTONE 25 MG/1
1 TABLET ORAL EVERY OTHER DAY
COMMUNITY
Start: 2022-01-03 | End: 2023-01-01

## 2023-01-01 RX ORDER — HYDROXYZINE HYDROCHLORIDE 25 MG/1
TABLET, FILM COATED ORAL
Qty: 90 TABLET | Refills: 0 | Status: SHIPPED | OUTPATIENT
Start: 2023-01-01 | End: 2023-06-21 | Stop reason: CLARIF

## 2023-01-01 RX ORDER — BUPROPION HYDROCHLORIDE 300 MG/1
300 TABLET ORAL DAILY
COMMUNITY
End: 2023-01-01

## 2023-01-01 RX ORDER — MIRTAZAPINE 30 MG/1
30 TABLET, FILM COATED ORAL NIGHTLY
COMMUNITY
Start: 2023-01-01 | End: 2023-01-01 | Stop reason: SDUPTHER

## 2023-01-01 RX ORDER — GLIPIZIDE 5 MG/1
1 TABLET ORAL 2 TIMES DAILY
COMMUNITY
Start: 2022-01-01 | End: 2023-01-01

## 2023-01-01 RX ORDER — BUPROPION HYDROCHLORIDE 300 MG/1
TABLET ORAL
Qty: 90 TABLET | Refills: 0 | Status: SHIPPED | OUTPATIENT
Start: 2023-01-01 | End: 2023-06-21 | Stop reason: CLARIF

## 2023-01-01 RX ORDER — MIRTAZAPINE 30 MG/1
1 TABLET, FILM COATED ORAL NIGHTLY
COMMUNITY
Start: 2023-01-01

## 2023-01-01 RX ORDER — DOXYCYCLINE HYCLATE 100 MG
TABLET ORAL 2 TIMES DAILY
COMMUNITY
Start: 2017-03-13

## 2023-01-01 RX ORDER — CARVEDILOL 3.12 MG/1
3.12 TABLET ORAL
COMMUNITY
End: 2023-01-01 | Stop reason: SDUPTHER

## 2023-01-01 RX ORDER — BLOOD SUGAR DIAGNOSTIC
STRIP MISCELLANEOUS
COMMUNITY
Start: 2018-10-04

## 2023-01-01 ASSESSMENT — ENCOUNTER SYMPTOMS
PALPITATIONS: 0
SORE THROAT: 0
PALPITATIONS: 0
NECK STIFFNESS: 0
SHORTNESS OF BREATH: 0
POLYDIPSIA: 0
BLOOD IN STOOL: 0
BRUISES/BLEEDS EASILY: 0
RHINORRHEA: 0
PALPITATIONS: 0
HALLUCINATIONS: 0
NECK STIFFNESS: 0
UNEXPECTED WEIGHT CHANGE: 0
HEMATURIA: 0
PALPITATIONS: 0
EYE REDNESS: 0
WEAKNESS: 0
DYSURIA: 0
COUGH: 0
BLOOD IN STOOL: 0
WOUND: 0
HEMATURIA: 0
SHORTNESS OF BREATH: 0
ABDOMINAL PAIN: 0
EYE PAIN: 0
ADENOPATHY: 0
FEVER: 0
WEAKNESS: 1
HEADACHES: 0
POLYDIPSIA: 0
FATIGUE: 0
EYE PAIN: 0
WOUND: 0
VOMITING: 0
RHINORRHEA: 0
BACK PAIN: 0
UNEXPECTED WEIGHT CHANGE: 0
HEADACHES: 0
BRUISES/BLEEDS EASILY: 0
HALLUCINATIONS: 0
COUGH: 0
VOMITING: 0
CONSTIPATION: 0
ABDOMINAL PAIN: 0
SORE THROAT: 0
ADENOPATHY: 0
BACK PAIN: 0
CHILLS: 0
EYE REDNESS: 0
FEVER: 0
FATIGUE: 0
CONSTIPATION: 0
CHILLS: 0
DYSURIA: 0

## 2023-03-13 PROBLEM — E66.01 MORBID OBESITY WITH BODY MASS INDEX (BMI) OF 40.0 OR HIGHER (MULTI): Status: ACTIVE | Noted: 2023-01-01

## 2023-03-13 PROBLEM — M48.062 LUMBAR STENOSIS WITH NEUROGENIC CLAUDICATION: Status: ACTIVE | Noted: 2023-01-01

## 2023-03-13 PROBLEM — M19.019 PRIMARY OSTEOARTHRITIS OF SHOULDER: Status: ACTIVE | Noted: 2023-01-01

## 2023-03-13 PROBLEM — Z95.2 S/P TAVR (TRANSCATHETER AORTIC VALVE REPLACEMENT): Status: ACTIVE | Noted: 2023-01-01

## 2023-03-13 PROBLEM — R04.0 EPISTAXIS: Status: ACTIVE | Noted: 2023-01-01

## 2023-03-13 PROBLEM — M16.10 PRIMARY LOCALIZED OSTEOARTHRITIS OF PELVIC REGION AND THIGH: Status: ACTIVE | Noted: 2023-01-01

## 2023-03-13 PROBLEM — E03.9 HYPOTHYROID: Status: ACTIVE | Noted: 2023-01-01

## 2023-03-13 PROBLEM — E11.9 CONTROLLED DIABETES MELLITUS (MULTI): Status: ACTIVE | Noted: 2023-01-01

## 2023-03-13 PROBLEM — H93.13 SUBJECTIVE TINNITUS, BILATERAL: Status: ACTIVE | Noted: 2023-01-01

## 2023-03-13 PROBLEM — F32.1 CURRENT MODERATE EPISODE OF MAJOR DEPRESSIVE DISORDER (MULTI): Status: ACTIVE | Noted: 2023-01-01

## 2023-03-13 PROBLEM — E78.2 COMBINED HYPERLIPIDEMIA: Status: ACTIVE | Noted: 2023-01-01

## 2023-03-13 PROBLEM — R10.13 CONTINUOUS EPIGASTRIC PAIN: Status: ACTIVE | Noted: 2023-01-01

## 2023-03-13 PROBLEM — A49.02 MRSA INFECTION (METHICILLIN-RESISTANT STAPHYLOCOCCUS AUREUS): Status: ACTIVE | Noted: 2023-01-01

## 2023-03-13 PROBLEM — R53.1 WEAKNESS: Status: ACTIVE | Noted: 2023-01-01

## 2023-03-13 PROBLEM — H90.3 SENSORINEURAL HEARING LOSS OF BOTH EARS: Status: ACTIVE | Noted: 2023-01-01

## 2023-03-13 PROBLEM — L71.9 ROSACEA: Status: ACTIVE | Noted: 2023-01-01

## 2023-03-13 PROBLEM — K21.9 GERD WITHOUT ESOPHAGITIS: Status: ACTIVE | Noted: 2023-01-01

## 2023-03-13 PROBLEM — G47.33 OBSTRUCTIVE SLEEP APNEA: Status: ACTIVE | Noted: 2023-01-01

## 2023-03-13 PROBLEM — C22.0 HEPATOCELLULAR CARCINOMA (MULTI): Status: ACTIVE | Noted: 2023-01-01

## 2023-03-13 PROBLEM — G47.01 INSOMNIA DUE TO MEDICAL CONDITION: Status: ACTIVE | Noted: 2023-01-01

## 2023-03-13 PROBLEM — K74.60 CIRRHOSIS, NONALCOHOLIC (MULTI): Status: ACTIVE | Noted: 2023-01-01

## 2023-03-13 PROBLEM — G89.29 CHRONIC LOW BACK PAIN: Status: ACTIVE | Noted: 2023-01-01

## 2023-03-13 PROBLEM — M96.1 FAILED BACK SYNDROME OF LUMBAR SPINE: Status: ACTIVE | Noted: 2023-01-01

## 2023-03-13 PROBLEM — D12.6 TUBULAR ADENOMA OF COLON: Status: ACTIVE | Noted: 2023-01-01

## 2023-03-13 PROBLEM — N40.1 HYPERPLASIA OF PROSTATE WITH LOWER URINARY TRACT SYMPTOMS (LUTS): Status: ACTIVE | Noted: 2023-01-01

## 2023-03-13 PROBLEM — I10 ESSENTIAL HYPERTENSION, BENIGN: Status: ACTIVE | Noted: 2023-01-01

## 2023-03-13 PROBLEM — R26.9 ABNORMAL GAIT: Status: ACTIVE | Noted: 2023-01-01

## 2023-03-13 PROBLEM — U07.1 COVID-19: Status: ACTIVE | Noted: 2023-01-01

## 2023-03-13 PROBLEM — I50.40 COMBINED SYSTOLIC AND DIASTOLIC CONGESTIVE HEART FAILURE (MULTI): Status: ACTIVE | Noted: 2023-01-01

## 2023-03-13 PROBLEM — K76.82 HEPATIC ENCEPHALOPATHY (MULTI): Status: ACTIVE | Noted: 2023-01-01

## 2023-03-13 PROBLEM — I86.4 GASTRIC VARICES: Status: ACTIVE | Noted: 2023-01-01

## 2023-03-13 PROBLEM — M25.551 RIGHT HIP PAIN: Status: ACTIVE | Noted: 2023-01-01

## 2023-03-13 PROBLEM — R11.2 NAUSEA AND VOMITING: Status: ACTIVE | Noted: 2023-01-01

## 2023-03-13 PROBLEM — L73.2 HYDRADENITIS: Status: ACTIVE | Noted: 2023-01-01

## 2023-03-13 PROBLEM — H61.23 BILATERAL IMPACTED CERUMEN: Status: ACTIVE | Noted: 2023-01-01

## 2023-03-13 PROBLEM — M54.50 CHRONIC LOW BACK PAIN: Status: ACTIVE | Noted: 2023-01-01

## 2023-03-13 NOTE — TELEPHONE ENCOUNTER
requesting order for xray of PTs hand, recent hospital visit/ wanted follow up apt but declined to take next available.   Leena also called requesting info on recent orders sent in for her (sent in separate message) was told to call in on Monday.

## 2023-03-13 NOTE — PROGRESS NOTES
Discharge facility: [facility]  Discharge diagnosis:Hepatic Encephalopathy  Admission date: 3-7-23  Discharge date: 3-11-23    TCM call patient. Spoke with patient's wife. She reports he is doing alright. Denies increased shortness of breath or chest pain. Pateint is now weraing oxygen all the time. He is eating with no problems. He does have swelling and redness to his right hand. Instructed on Dr's instruction to keep hand elevated and he may use an ace wrap if they have one. Instructed on appointment scheduled for Thursday 3-16-23 at 11:00 am and Dr will further address hand at that time if needed. Instructed  to call with any questions or concerns.      Engagement  Call Start Time: 1300 (3/13/2023  2:06 PM)    Medications  Medications reviewed with patient/caregiver?: Yes (3/13/2023  2:06 PM)  Is the patient having any side effects they believe may be caused by any medication additions or changes?: No (3/13/2023  2:06 PM)  Does the patient have all medications ordered at discharge?: Yes (3/13/2023  2:06 PM)  Care Management Interventions: Nurse provided patient education (3/13/2023  2:06 PM)  Is the patient taking all medications as directed (includes completed medication regime)?: Yes (Wife reports patient continues to take mirtazapine) (3/13/2023  2:06 PM)  Care Management Interventions: Nurse provided patient education (3/13/2023  2:06 PM)    Appointments  Does the patient have a primary care provider?: Yes (3/13/2023  2:06 PM)  Care Management Interventions: Educated patient on importance of making appointment (3/13/2023  2:06 PM)  Has the patient kept scheduled appointments due by today?: Yes (3/13/2023  2:06 PM)  Care Management Interventions: Advised patient to keep appointment; Educated on importance of keeping appointment (3/13/2023  2:06 PM)    Self Management  What is the home health agency?: [facility] (3/13/2023  2:06 PM)  Has home health visited the patient within 72 hours of  discharge?: Yes (3/13/2023  2:06 PM)    Patient Teaching  Does the patient have access to their discharge instructions?: Yes (3/13/2023  2:06 PM)  Care Management Interventions: Reviewed instructions with patient (3/13/2023  2:06 PM)  What is the patient's perception of their health status since discharge?: Same (Experincing reddnes an swelling in right hand) (3/13/2023  2:06 PM)    Wrap Up  Call End Time: 1315 (3/13/2023  2:06 PM)

## 2023-03-14 PROBLEM — L29.9 ITCHING OF EAR: Status: RESOLVED | Noted: 2023-01-01 | Resolved: 2023-01-01

## 2023-03-16 NOTE — PROGRESS NOTES
Subjective   Patient ID: Sam Slater is a 67 y.o. male who presents for Hospital Follow-up (Transitional Care Management Visit/Date of Admission: 3/7/23/Date of Discharge: 3/11/23/Date of phone call to pt. :  3/13/23/Name of facility where discharged: [facility]).    HPI   Hospitalized with metabolic encephalopathy and ascites  Ascites drained  Cephalopathy mental status cleared  Is compliant with meds but is weak and will be starting home PT  Is eating but has lost some muscle mass  Review of Systems   Constitutional:  Negative for unexpected weight change.   HENT:  Negative for congestion and ear discharge.    Cardiovascular:  Negative for chest pain and palpitations.   Gastrointestinal:  Negative for constipation and vomiting.   All other systems reviewed and are negative.      Objective   /72   Pulse 88   SpO2 94%     Physical Exam  HENT:      Head: Normocephalic and atraumatic.      Nose: Nose normal.      Mouth/Throat:      Mouth: Mucous membranes are moist.      Pharynx: No oropharyngeal exudate.   Eyes:      Extraocular Movements: Extraocular movements intact.      Conjunctiva/sclera: Conjunctivae normal.      Pupils: Pupils are equal, round, and reactive to light.   Cardiovascular:      Rate and Rhythm: Normal rate and regular rhythm.   Pulmonary:      Effort: Pulmonary effort is normal.   Abdominal:      General: There is no distension.      Palpations: Abdomen is soft.   Musculoskeletal:      Cervical back: Normal range of motion and neck supple.   Lymphadenopathy:      Cervical: No cervical adenopathy.   Neurological:      General: No focal deficit present.      Mental Status: He is alert.   Psychiatric:         Attention and Perception: Attention normal.         Speech: Speech normal.         Behavior: Behavior is cooperative.     Sitting in wheelchair    Assessment/Plan   Diagnoses and all orders for this visit:  Hydradenitis  -     doxycycline (Vibramycin) 100 mg capsule; Take 1 capsule (100  mg) by mouth in the morning and 1 capsule (100 mg) before bedtime. Do all this for 90 doses. Take with at least 8 ounces (large glass) of water, do not lie down for 30 minutes after.  Current moderate episode of major depressive disorder, unspecified whether recurrent (CMS/HCC)  -     mirtazapine (Remeron) 45 mg tablet; Take 1 tablet (45 mg) by mouth once daily at bedtime.  Hepatic encephalopathy  Acute on chronic combined systolic and diastolic congestive heart failure (CMS/HCC)  Cirrhosis, nonalcoholic (CMS/HCC)  Hepatocellular carcinoma (CMS/HCC)  Other ascites       Reviewed hospital record with patient  Wife is present  Refills  Recheck 3 months sooner if necessary  Follow-up with oncology as directed

## 2023-03-24 NOTE — LETTER
Patient: Ryan Slater  : 1955    Encounter Date: 2023    *Provider Impression*  Patient is a 67 year old male who is seen today for management of multiple medical problems  #Weakness / OA / Lumbar stenosis - PT/OT, turmeric 1500mg daily, oxycodone 10mg q6h PRN, acetaminophen 650mg q4h PRN  #Cirrhosis / Hepatic encephalopathy - lactulose 20grams/30mL daily, spironolactone 25mg every other day, neomycin 500mg BID until 3/28  #UTI - keflex 500mg q6h until 3/30, macrobid 100mg BID until 3/30, doxycyline 100mg BID until 3/30  #RAYMOND - CPAP  #CHF / HLD - carvedilol 3.125mg BID, cholestyramine 4grams daily  #T2DM - glipizide 5mg BID  #Hypothyroidism - levothyroxine 175mcg daily  #GERD / Nausea - zofran 8mg BID and q4h PRN, pantoprazole 40mg daily  #Pruritis - hydrocortisone TID PRN  #Depression - bupropion XL 300mg / 150mg daily  Follow up as needed      *Chief Complaint*  cirrhosis      *History of Present Illness*  Pt is a 66 y/o male w/ PMH as below who presented to the ED and was admitted with hepatic encephalopathy. The patient was treated with rifaximin and lactulose. PT and OT evaluated the patient and recommended moderate intensity. The patient was treated for UTI and prophylactically for SBP with ceftriaxone. The Urine Culture grew Proteus Mirabalis and E. Faecalis which were treated on discharge with macrobid 100 mg every 12 hours and keflex 250 mg every 6 hours from 3/24-3/29 for a complete course of 7 days 3/23-3/29. The patient was previously treated successfully with neomycin, the toxicities and risks of neomycin were discussed and patient is aware. The patient will have close follow up with audiology after discharge and while using neomycin. The patient was hemodynamically stable and ready for discharge to SNF so was d/c to [facility] @ [place]    He is seen sitting up in his room shortly after his arrival and denies any f/c, sweats, little sick from car ride, no emesis, no constipation, diarrhea,  numbness, tingling, paresthesas, no CP, SOB, edema, or any other new c/o presently.    Allergies - flexeril  PMH - HCC, nonalcoholic liver cirrhosis, combined heart failure, hypothyroidism, DM-type II, HTN, lumbar stenosis, chronic thrombocytopenia, gastric varices, GERD, hypothyroidism, RAYMOND, OA  PSH - TAVR, back surgery, hand surgery, hip replacement, shoulder surgery, tonsillectomy, TENS  FH - heart disease, RAYMOND, cancer  SocHx - Never smoker, No EtOH      *Review of Systems*  All other systems reviewed are negative except as noted in the HPI     *Vitals*  Date: 3/24/22 - T: 98.1 P: 82 R: 18 BP: 138/77 SpO2: 96%      *Results/Data*  CBC - Date: 3/24/23 WBC: 7.8 HGB: 11.4 HCT: 35.3 PLT: 99 ;   BMP - Date: 3/24/23 Na: 133 K: 4.4 Cl: 106 CO2: 21 BUN: 15 Cr: 1.12 Glu: 151 Ca: 7.9 ;   LFT - Date: 3/24/23 AST: 51 ALT: 29 ALP: 166 TBili: 1.2 ;   Coags - Date: INR: PT:   Other - Date: 9/7/22 Ammonia: 124; 9/14/22 - Ammonia: 98 TSH: 2.95 AFP: 5; 9/23/22 Ammonia: 75     *Physical Exam*  Gen: (+) NAD, (+) well-appearing  HEENT: (+) normocephalic, (+) MMM  Neck: (+) supple  Lungs: (+) CTAB, (-) wheezes, (-) rales, (-) rhonchi  Heart: (+) RRR, (+) S1 S2, (-) murmurs  Pulses: (+) palpable  Abd: (+) soft, (+) NT, (+) ND, (+) BS+  Ext: (-) edema, (-) deformity  MSK: (-) joint swelling  Skin: (+) warm, (+) dry, (-) rash  Neuro: (+) follows commands, (-) tremor, (+) alert      Electronically Signed By: Dain Lewis, APRN-CNP   3/27/23 11:07 PM

## 2023-03-25 NOTE — LETTER
Patient: Ryan Slater  : 1955    Encounter Date: 2023    Ryan Slater is a 68 y.o. male with chief complaint or SNF ([facility]) H&P    Resident seen 3/25/23 -- MP    CC: SNF ([facility]) H&P    : 1955  Admit H&P done 2021 ([facility]), 3/25/2023 ([facility])  Allergy: Flexeril (intolerance)  Full Code    S: 67 yo man with HFpEF, DM, Depression/anxiety, liver cancer on immunotherapy, admitted for SNF rehab after recent hospitalization for recurrent bout of hepatic encephalopathy.  No cp/sob. Med List & Problem List reviewed.    O: VSS AFEB Wt 255# Awake, alert, NAD off O2. Chest cta. Heart rrr, 2/6 MARIELLE. Ext no c/c/e.    MOCA (11/15/21)     LAB (3/24/23) Na 133, K 4.4, Alb 2.6, Mg 1.74    A/P:  # Weakness: PT/OT  # Hepatic encephalopathy and Liver cancer on immunotherapy: prn bursts of neomycin 1500 mg q6 hours for 5-6 days effective at lowering ammonia when he refuses lactulose. Continue lactulose to 30 ml TID.  # CHF: spironolactone, BB  # DM: glipizide xl to 5 mg daily.  Patient and wife refuse metformin.   # Hypothyroidism: 175 mcg LT4  # Pain mgmt: oxycodone 10 mg q 6 h prn pain. Ibuprofen.  No tylenol.   # HTN: stable on coreg 3.125 bid.   # Depression and anxiety: appreciate Psych 360 help adjusting Welbutrin XL.     Past Surgical History:   Procedure Laterality Date   • BACK SURGERY  2014    Back Surgery   • CT ABDOMEN PELVIS ANGIOGRAM W AND/OR WO IV CONTRAST  10/20/2020    CT ABDOMEN PELVIS ANGIOGRAM W AND/OR WO IV CONTRAST 10/20/2020 [facility]   • CT GUIDED PERCUTANEOUS BIOPSY BONE DEEP  10/2/2020    CT GUIDED PERCUTANEOUS BIOPSY BONE DEEP 10/2/2020 [facility]   • HAND SURGERY  2014    Hand Surgery                                                                                                                                                         • SHOULDER SURGERY  2014    Shoulder Surgery   • TONSILLECTOMY  2014    Tonsillectomy   • TOTAL HIP  ARTHROPLASTY  05/27/2014    Hip Replacement   • US GUIDED ABDOMINAL PARACENTESIS  3/8/2023    US GUIDED ABDOMINAL PARACENTESIS [facility]       Social History     Socioeconomic History   • Marital status:      Spouse name: Not on file   • Number of children: Not on file   • Years of education: Not on file   • Highest education level: Not on file   Occupational History   • Not on file   Tobacco Use   • Smoking status: Never   • Smokeless tobacco: Never   Vaping Use   • Vaping status: Never Used   Substance and Sexual Activity   • Alcohol use: Never   • Drug use: Never   • Sexual activity: Not on file   Other Topics Concern   • Not on file   Social History Narrative   • Not on file     Social Determinants of Health     Financial Resource Strain: Not on file   Food Insecurity: Not on file   Transportation Needs: Not on file   Physical Activity: Not on file   Stress: Not on file   Social Connections: Not on file   Intimate Partner Violence: Not on file   Housing Stability: Not on file     Past Medical History:   Diagnosis Date   • Acute maxillary sinusitis, unspecified 04/03/2018    Acute maxillary sinusitis   • Essential (primary) hypertension 10/20/2013    Hypertension   • Hyperlipidemia, unspecified 10/20/2013    Hyperlipidemia   • Itching of ear 03/14/2023   • Nonrheumatic aortic (valve) stenosis 04/30/2020    Severe aortic stenosis   • Personal history of other diseases of the circulatory system 05/04/2020    History of aortic valve stenosis   • Personal history of other diseases of the musculoskeletal system and connective tissue     Personal history of arthritis   • Personal history of other mental and behavioral disorders     History of depression   • Unspecified otitis externa, left ear 12/16/2020    Otitis externa, left      Family History   Problem Relation Name Age of Onset   • Cancer Mother     • Breast cancer Mother     • Other (cardiac disorder) Father     • Cancer Father     • Prostate cancer Father      • Sleep apnea Other Sibling         Review of Systems   Constitutional:  Negative for chills, fatigue and fever.   HENT:  Negative for rhinorrhea and sore throat.    Eyes:  Negative for pain and redness.   Respiratory:  Negative for cough and shortness of breath.    Cardiovascular:  Negative for chest pain and palpitations.   Gastrointestinal:  Negative for abdominal pain and blood in stool.   Endocrine: Negative for polydipsia and polyuria.   Genitourinary:  Negative for dysuria and hematuria.   Musculoskeletal:  Negative for back pain and neck stiffness.   Skin:  Negative for rash and wound.   Allergic/Immunologic: Negative for environmental allergies and food allergies.   Neurological:  Negative for weakness and headaches.   Hematological:  Negative for adenopathy. Does not bruise/bleed easily.   Psychiatric/Behavioral:  Negative for hallucinations and suicidal ideas.       There were no vitals taken for this visit.  Physical Exam  Vitals reviewed.   Constitutional:       General: He is not in acute distress.     Appearance: He is not ill-appearing.   HENT:      Head: Normocephalic and atraumatic.      Right Ear: Tympanic membrane normal.      Left Ear: Tympanic membrane normal.      Nose: No congestion or rhinorrhea.      Mouth/Throat:      Pharynx: No oropharyngeal exudate or posterior oropharyngeal erythema.   Eyes:      Extraocular Movements: Extraocular movements intact.      Conjunctiva/sclera: Conjunctivae normal.      Pupils: Pupils are equal, round, and reactive to light.   Cardiovascular:      Rate and Rhythm: Normal rate and regular rhythm.      Heart sounds: No murmur heard.     No friction rub. No gallop.   Pulmonary:      Effort: Pulmonary effort is normal.      Breath sounds: Normal breath sounds. No wheezing, rhonchi or rales.   Abdominal:      General: There is no distension.      Palpations: Abdomen is soft.      Tenderness: There is no abdominal tenderness. There is no guarding or rebound.    Musculoskeletal:         General: No swelling or deformity.      Cervical back: Normal range of motion and neck supple.      Right lower leg: No edema.      Left lower leg: No edema.   Skin:     Capillary Refill: Capillary refill takes less than 2 seconds.      Coloration: Skin is not jaundiced.      Findings: No rash.   Neurological:      Mental Status: He is alert. He is disoriented.      Motor: No weakness.   Psychiatric:         Mood and Affect: Mood normal.         Behavior: Behavior normal.         Lab Results   Component Value Date    CHOL 209 (H) 04/01/2020    CHOL 186 05/07/2019    CHOL 192 05/25/2018     Lab Results   Component Value Date    HDL 44.5 04/01/2020    HDL 49.7 05/07/2019    HDL 46.0 05/25/2018     No results found for: LDLCALC  Lab Results   Component Value Date    TRIG 149 04/01/2020    TRIG 202 (H) 05/07/2019    TRIG 363 (H) 05/25/2018     No components found for: CHOLHDL  Lab Results   Component Value Date    HGBA1C 7.5 (A) 09/13/2022       Assessment/Plan  Problem List Items Addressed This Visit          Nervous    Hepatic encephalopathy - Primary       Circulatory    Combined systolic and diastolic congestive heart failure (CMS/HCC)    Essential hypertension, benign       Digestive    Cirrhosis, nonalcoholic (CMS/HCC)    Hepatocellular carcinoma (CMS/HCC)       Endocrine/Metabolic    Controlled diabetes mellitus (CMS/HCC)    Hypothyroid    Morbid obesity with body mass index (BMI) of 40.0 or higher (CMS/HCC)         Electronically Signed By: Geovany Vera MD   3/31/23  4:17 PM

## 2023-03-27 NOTE — TELEPHONE ENCOUNTER
Received critical lab ammonia level.  I will call the patient his wife and answered and she stated that he is in the nursing home.  Patient stated that she will contact the nursing home and discussed with the physician who is taking care of the patient

## 2023-03-28 NOTE — PROGRESS NOTES
*Provider Impression*  Patient is a 67 year old male who is seen today for management of multiple medical problems  #Weakness / OA / Lumbar stenosis - PT/OT, turmeric 1500mg daily, oxycodone 10mg q6h PRN, acetaminophen 650mg q4h PRN  #Cirrhosis / Hepatic encephalopathy - lactulose 20grams/30mL daily, spironolactone 25mg every other day, neomycin 500mg BID until 3/28  #UTI - keflex 500mg q6h until 3/30, macrobid 100mg BID until 3/30, doxycyline 100mg BID until 3/30  #RAYMOND - CPAP  #CHF / HLD - carvedilol 3.125mg BID, cholestyramine 4grams daily  #T2DM - glipizide 5mg BID  #Hypothyroidism - levothyroxine 175mcg daily  #GERD / Nausea - zofran 8mg BID and q4h PRN, pantoprazole 40mg daily  #Pruritis - hydrocortisone TID PRN  #Depression - bupropion XL 300mg / 150mg daily  Follow up as needed      *Chief Complaint*  cirrhosis      *History of Present Illness*  Pt is a 68 y/o male w/ PMH as below who presented to the ED and was admitted with hepatic encephalopathy. The patient was treated with rifaximin and lactulose. PT and OT evaluated the patient and recommended moderate intensity. The patient was treated for UTI and prophylactically for SBP with ceftriaxone. The Urine Culture grew Proteus Mirabalis and E. Faecalis which were treated on discharge with macrobid 100 mg every 12 hours and keflex 250 mg every 6 hours from 3/24-3/29 for a complete course of 7 days 3/23-3/29. The patient was previously treated successfully with neomycin, the toxicities and risks of neomycin were discussed and patient is aware. The patient will have close follow up with audiology after discharge and while using neomycin. The patient was hemodynamically stable and ready for discharge to SNF so was d/c to [facility] @ [place]    He is seen sitting up in his room shortly after his arrival and denies any f/c, sweats, little sick from car ride, no emesis, no constipation, diarrhea, numbness, tingling, paresthesas, no CP, SOB, edema, or any other new c/o  presently.    Allergies - flexeril  PMH - HCC, nonalcoholic liver cirrhosis, combined heart failure, hypothyroidism, DM-type II, HTN, lumbar stenosis, chronic thrombocytopenia, gastric varices, GERD, hypothyroidism, RAYMOND, OA  PSH - TAVR, back surgery, hand surgery, hip replacement, shoulder surgery, tonsillectomy, TENS  FH - heart disease, RAYMOND, cancer  SocHx - Never smoker, No EtOH      *Review of Systems*  All other systems reviewed are negative except as noted in the HPI     *Vitals*  Date: 3/24/22 - T: 98.1 P: 82 R: 18 BP: 138/77 SpO2: 96%      *Results/Data*  CBC - Date: 3/24/23 WBC: 7.8 HGB: 11.4 HCT: 35.3 PLT: 99 ;   BMP - Date: 3/24/23 Na: 133 K: 4.4 Cl: 106 CO2: 21 BUN: 15 Cr: 1.12 Glu: 151 Ca: 7.9 ;   LFT - Date: 3/24/23 AST: 51 ALT: 29 ALP: 166 TBili: 1.2 ;   Coags - Date: INR: PT:   Other - Date: 9/7/22 Ammonia: 124; 9/14/22 - Ammonia: 98 TSH: 2.95 AFP: 5; 9/23/22 Ammonia: 75     *Physical Exam*  Gen: (+) NAD, (+) well-appearing  HEENT: (+) normocephalic, (+) MMM  Neck: (+) supple  Lungs: (+) CTAB, (-) wheezes, (-) rales, (-) rhonchi  Heart: (+) RRR, (+) S1 S2, (-) murmurs  Pulses: (+) palpable  Abd: (+) soft, (+) NT, (+) ND, (+) BS+  Ext: (-) edema, (-) deformity  MSK: (-) joint swelling  Skin: (+) warm, (+) dry, (-) rash  Neuro: (+) follows commands, (-) tremor, (+) alert

## 2023-03-29 NOTE — LETTER
Patient: Ryan Slater  : 1955    Encounter Date: 2023    *Provider Impression*  Patient is a 67 year old male who is seen today for management of multiple medical problems  #Weakness / OA / Lumbar stenosis - PT/OT, turmeric 1500mg daily, oxycodone 10mg q6h PRN, acetaminophen 650mg q4h PRN, add ibuprofen 600mg q6h PRN  #Cirrhosis / Hepatic encephalopathy - lactulose 20grams/30mL daily, spironolactone 25mg every other day  #UTI - keflex 500mg q6h until 3/30, macrobid 100mg BID until 3/30, doxycyline 100mg BID until 3/30  #RAYMOND - CPAP, check CXR 2v  #CHF / HLD - carvedilol 3.125mg BID, cholestyramine 4grams daily  #T2DM - glipizide 5mg BID  #Hypothyroidism - levothyroxine 175mcg daily  #GERD / Nausea - zofran 8mg BID and q4h PRN, pantoprazole 40mg daily  #Pruritis - hydrocortisone TID PRN  #Depression - bupropion XL 300mg / 150mg daily  Follow up as needed      *Chief Complaint*  cirrhosis      *History of Present Illness*  Pt is a 68 y/o male w/ PMH as below who presented to the ED and was admitted with hepatic encephalopathy. The patient was treated with rifaximin and lactulose. PT and OT evaluated the patient and recommended moderate intensity. The patient was treated for UTI and prophylactically for SBP with ceftriaxone. The Urine Culture grew Proteus Mirabalis and E. Faecalis which were treated on discharge with macrobid 100 mg every 12 hours and keflex 250 mg every 6 hours from 3/24-3/29 for a complete course of 7 days 3/23-3/29. The patient was previously treated successfully with neomycin, the toxicities and risks of neomycin were discussed and patient is aware. The patient will have close follow up with audiology after discharge and while using neomycin. The patient was hemodynamically stable and ready for discharge to SNF so was d/c to [facility] @ [place]    He had a critical ammonia yesterday. Improved today although still critical. He is seen sitting up in his room today w/ wife at the bedside. She  reports that his ammonia is almost never normal and does not correlate with his symptoms. He reports having headache, and is requesting ibuprofen. We discuss this including risk of bleeding varices and he does have a history of varices that were clipped. But he takes it at home and has had no issues and he has d/w his hepatologist. No f/c, sweats, arms are steady, no asterixis, CP, SOB, cough, had some RAMSEY w/ therapy, checked pulse ox and was low but improved w/ rest, no edema, n/v, constipation, has been having 5-6 bowel movements a day, loose, and no other new c/o presently.    Allergies - flexeril  PMH - HCC, nonalcoholic liver cirrhosis, combined heart failure, hypothyroidism, DM-type II, HTN, lumbar stenosis, chronic thrombocytopenia, gastric varices, GERD, hypothyroidism, RAYMOND, OA  PSH - TAVR, back surgery, hand surgery, hip replacement, shoulder surgery, tonsillectomy, TENS  FH - heart disease, RAYMOND, cancer  SocHx - Never smoker, No EtOH      *Review of Systems*  All other systems reviewed are negative except as noted in the HPI     *Vitals*  Date: 3/29/23 - T: 98.1 P: 84 R: 18 BP: 138/78 SpO2: 98% ; Date: 3/29/23 - Wt: 260.5     *Results/Data*  CBC - Date: 3/27/23 WBC: 8.8 HGB: 11.5 HCT: 36.7 PLT: 84 ;   BMP - Date: 3/27/23 Na: 133 K: 4.8 Cl: 107 CO2: 18 BUN: 17 Cr: 1.25 Glu: 118 Ca: 8.0 ;   LFT - Date: 3/27/23 AST: 73 ALT: 37 ALP: 182 TBili: 1.1 ;   Coags - Date: INR: PT:   Other - Date: 9/7/22 Ammonia: 124; 9/14/22 - Ammonia: 98 TSH: 2.95 AFP: 5; 9/23/22 Ammonia: 75 ; 3/29/23 - Ammonia: 123    *Physical Exam*  Gen: (+) NAD, (+) well-appearing  HEENT: (+) normocephalic, (+) MMM  Neck: (+) supple  Lungs: (+) CTAB, (-) wheezes, (-) rales, (-) rhonchi  Heart: (+) RRR, (+) S1 S2, (-) murmurs  Pulses: (+) palpable  Abd: (+) soft, (+) NT, (+) ND, (+) BS+  Ext: (-) edema, (-) deformity  MSK: (-) joint swelling  Skin: (+) warm, (+) dry, (-) rash  Neuro: (+) follows commands, (-) tremor, (+) alert      Electronically  Signed By: Dain Lewis, ZEFERINO-CNP   4/3/23 11:30 AM

## 2023-03-31 NOTE — PROGRESS NOTES
Ryan Slater is a 68 y.o. male with chief complaint or SNF ([facility]) H&P    Resident seen 3/25/23 -- MP    CC: SNF ([facility]) H&P    : 1955  Admit H&P done 2021 ([facility]), 3/25/2023 ([facility])  Allergy: Flexeril (intolerance)  Full Code    S: 69 yo man with HFpEF, DM, Depression/anxiety, liver cancer on immunotherapy, admitted for SNF rehab after recent hospitalization for recurrent bout of hepatic encephalopathy.  No cp/sob. Med List & Problem List reviewed.    O: VSS AFEB Wt 255# Awake, alert, NAD off O2. Chest cta. Heart rrr, 26 MARIELLE. Ext no c/c/e.    MOCA (11/15/21)     LAB (3/24/23) Na 133, K 4.4, Alb 2.6, Mg 1.74    A/P:  # Weakness: PT/OT  # Hepatic encephalopathy and Liver cancer on immunotherapy: prn bursts of neomycin 1500 mg q6 hours for 5-6 days effective at lowering ammonia when he refuses lactulose. Continue lactulose to 30 ml TID.  # CHF: spironolactone, BB  # DM: glipizide xl to 5 mg daily.  Patient and wife refuse metformin.   # Hypothyroidism: 175 mcg LT4  # Pain mgmt: oxycodone 10 mg q 6 h prn pain. Ibuprofen.  No tylenol.   # HTN: stable on coreg 3.125 bid.   # Depression and anxiety: appreciate Psych 360 help adjusting Welbutrin XL.     Past Surgical History:   Procedure Laterality Date    BACK SURGERY  2014    Back Surgery    CT ABDOMEN PELVIS ANGIOGRAM W AND/OR WO IV CONTRAST  10/20/2020    CT ABDOMEN PELVIS ANGIOGRAM W AND/OR WO IV CONTRAST 10/20/2020 [facility]    CT GUIDED PERCUTANEOUS BIOPSY BONE DEEP  10/2/2020    CT GUIDED PERCUTANEOUS BIOPSY BONE DEEP 10/2/2020 [facility]    HAND SURGERY  2014    Hand Surgery                                                                                                                                                          SHOULDER SURGERY  2014    Shoulder Surgery    TONSILLECTOMY  2014    Tonsillectomy    TOTAL HIP ARTHROPLASTY  2014    Hip Replacement    US GUIDED ABDOMINAL PARACENTESIS   3/8/2023     GUIDED ABDOMINAL PARACENTESIS [facility]       Social History     Socioeconomic History    Marital status:      Spouse name: Not on file    Number of children: Not on file    Years of education: Not on file    Highest education level: Not on file   Occupational History    Not on file   Tobacco Use    Smoking status: Never    Smokeless tobacco: Never   Vaping Use    Vaping status: Never Used   Substance and Sexual Activity    Alcohol use: Never    Drug use: Never    Sexual activity: Not on file   Other Topics Concern    Not on file   Social History Narrative    Not on file     Social Determinants of Health     Financial Resource Strain: Not on file   Food Insecurity: Not on file   Transportation Needs: Not on file   Physical Activity: Not on file   Stress: Not on file   Social Connections: Not on file   Intimate Partner Violence: Not on file   Housing Stability: Not on file     Past Medical History:   Diagnosis Date    Acute maxillary sinusitis, unspecified 04/03/2018    Acute maxillary sinusitis    Essential (primary) hypertension 10/20/2013    Hypertension    Hyperlipidemia, unspecified 10/20/2013    Hyperlipidemia    Itching of ear 03/14/2023    Nonrheumatic aortic (valve) stenosis 04/30/2020    Severe aortic stenosis    Personal history of other diseases of the circulatory system 05/04/2020    History of aortic valve stenosis    Personal history of other diseases of the musculoskeletal system and connective tissue     Personal history of arthritis    Personal history of other mental and behavioral disorders     History of depression    Unspecified otitis externa, left ear 12/16/2020    Otitis externa, left      Family History   Problem Relation Name Age of Onset    Cancer Mother      Breast cancer Mother      Other (cardiac disorder) Father      Cancer Father      Prostate cancer Father      Sleep apnea Other Sibling         Review of Systems   Constitutional:  Negative for chills, fatigue and  fever.   HENT:  Negative for rhinorrhea and sore throat.    Eyes:  Negative for pain and redness.   Respiratory:  Negative for cough and shortness of breath.    Cardiovascular:  Negative for chest pain and palpitations.   Gastrointestinal:  Negative for abdominal pain and blood in stool.   Endocrine: Negative for polydipsia and polyuria.   Genitourinary:  Negative for dysuria and hematuria.   Musculoskeletal:  Negative for back pain and neck stiffness.   Skin:  Negative for rash and wound.   Allergic/Immunologic: Negative for environmental allergies and food allergies.   Neurological:  Negative for weakness and headaches.   Hematological:  Negative for adenopathy. Does not bruise/bleed easily.   Psychiatric/Behavioral:  Negative for hallucinations and suicidal ideas.       There were no vitals taken for this visit.  Physical Exam  Vitals reviewed.   Constitutional:       General: He is not in acute distress.     Appearance: He is not ill-appearing.   HENT:      Head: Normocephalic and atraumatic.      Right Ear: Tympanic membrane normal.      Left Ear: Tympanic membrane normal.      Nose: No congestion or rhinorrhea.      Mouth/Throat:      Pharynx: No oropharyngeal exudate or posterior oropharyngeal erythema.   Eyes:      Extraocular Movements: Extraocular movements intact.      Conjunctiva/sclera: Conjunctivae normal.      Pupils: Pupils are equal, round, and reactive to light.   Cardiovascular:      Rate and Rhythm: Normal rate and regular rhythm.      Heart sounds: No murmur heard.     No friction rub. No gallop.   Pulmonary:      Effort: Pulmonary effort is normal.      Breath sounds: Normal breath sounds. No wheezing, rhonchi or rales.   Abdominal:      General: There is no distension.      Palpations: Abdomen is soft.      Tenderness: There is no abdominal tenderness. There is no guarding or rebound.   Musculoskeletal:         General: No swelling or deformity.      Cervical back: Normal range of motion and  neck supple.      Right lower leg: No edema.      Left lower leg: No edema.   Skin:     Capillary Refill: Capillary refill takes less than 2 seconds.      Coloration: Skin is not jaundiced.      Findings: No rash.   Neurological:      Mental Status: He is alert. He is disoriented.      Motor: No weakness.   Psychiatric:         Mood and Affect: Mood normal.         Behavior: Behavior normal.         Lab Results   Component Value Date    CHOL 209 (H) 04/01/2020    CHOL 186 05/07/2019    CHOL 192 05/25/2018     Lab Results   Component Value Date    HDL 44.5 04/01/2020    HDL 49.7 05/07/2019    HDL 46.0 05/25/2018     No results found for: LDLCALC  Lab Results   Component Value Date    TRIG 149 04/01/2020    TRIG 202 (H) 05/07/2019    TRIG 363 (H) 05/25/2018     No components found for: CHOLHDL  Lab Results   Component Value Date    HGBA1C 7.5 (A) 09/13/2022       Assessment/Plan   Problem List Items Addressed This Visit          Nervous    Hepatic encephalopathy - Primary       Circulatory    Combined systolic and diastolic congestive heart failure (CMS/HCC)    Essential hypertension, benign       Digestive    Cirrhosis, nonalcoholic (CMS/HCC)    Hepatocellular carcinoma (CMS/HCC)       Endocrine/Metabolic    Controlled diabetes mellitus (CMS/HCC)    Hypothyroid    Morbid obesity with body mass index (BMI) of 40.0 or higher (CMS/HCC)

## 2023-04-03 NOTE — LETTER
Patient: Ryan Slater  : 1955    Encounter Date: 2023    Resident seen 4/3/23 -- MP    CC: SNF ([facility]) Recheck    : 1955  Admit H&P done 2021 ([facility]), 3/25/2023 ([place]/[facility])  Allergy: Flexeril (intolerance)  Full Code    S: 67 yo man with HFpEF, DM, Depression/anxiety, liver cancer on immunotherapy, admitted for SNF rehab after recent hospitalization for recurrent bout of hepatic encephalopathy. No cp/sob. Med List & Problem List reviewed.    O: VSS AFEB Wt 255# Awake, alert, NAD off O2. Chest cta. Heart rrr,  MARIELLE. Ext no c/c/e.    MOCA (11/15/21)     LAB  (3/31/23) Ammonia 79  (3/24/23) Na 133, K 4.4, Alb 2.6, Mg 1.74    A/P:  # Weakness: PT/OT  # Hepatic encephalopathy and Liver cancer on immunotherapy: prn bursts of neomycin 1500 mg q6 hours for 5-6 days effective at lowering ammonia when he refuses lactulose. Continue lactulose 30 ml TID -- ok to hold dose after 4th BM of the day.   # CHF: spironolactone, BB  # DM: glipizide xl to 5 mg daily. Patient and wife refuse metformin.  # Hypothyroidism: 175 mcg LT4  # Pain mgmt: oxycodone 10 mg q 6 h prn pain. Ibuprofen. No tylenol.  # HTN: stable on coreg 3.125 bid.  # Depression and anxiety: appreciate Psych 360 help adjusting Welbutrin XL.      Electronically Signed By: Geovany Vera MD   23  7:17 PM

## 2023-04-03 NOTE — PROGRESS NOTES
*Provider Impression*  Patient is a 67 year old male who is seen today for management of multiple medical problems  #Weakness / OA / Lumbar stenosis - PT/OT, turmeric 1500mg daily, oxycodone 10mg q6h PRN, acetaminophen 650mg q4h PRN, add ibuprofen 600mg q6h PRN  #Cirrhosis / Hepatic encephalopathy - lactulose 20grams/30mL daily, spironolactone 25mg every other day  #UTI - keflex 500mg q6h until 3/30, macrobid 100mg BID until 3/30, doxycyline 100mg BID until 3/30  #RAYMOND - CPAP, check CXR 2v  #CHF / HLD - carvedilol 3.125mg BID, cholestyramine 4grams daily  #T2DM - glipizide 5mg BID  #Hypothyroidism - levothyroxine 175mcg daily  #GERD / Nausea - zofran 8mg BID and q4h PRN, pantoprazole 40mg daily  #Pruritis - hydrocortisone TID PRN  #Depression - bupropion XL 300mg / 150mg daily  Follow up as needed      *Chief Complaint*  cirrhosis      *History of Present Illness*  Pt is a 66 y/o male w/ PMH as below who presented to the ED and was admitted with hepatic encephalopathy. The patient was treated with rifaximin and lactulose. PT and OT evaluated the patient and recommended moderate intensity. The patient was treated for UTI and prophylactically for SBP with ceftriaxone. The Urine Culture grew Proteus Mirabalis and E. Faecalis which were treated on discharge with macrobid 100 mg every 12 hours and keflex 250 mg every 6 hours from 3/24-3/29 for a complete course of 7 days 3/23-3/29. The patient was previously treated successfully with neomycin, the toxicities and risks of neomycin were discussed and patient is aware. The patient will have close follow up with audiology after discharge and while using neomycin. The patient was hemodynamically stable and ready for discharge to SNF so was d/c to [facility] @ [facility] [place]    He had a critical ammonia yesterday. Improved today although still critical. He is seen sitting up in his room today w/ wife at the bedside. She reports that his ammonia is almost never normal and does not correlate  with his symptoms. He reports having headache, and is requesting ibuprofen. We discuss this including risk of bleeding varices and he does have a history of varices that were clipped. But he takes it at home and has had no issues and he has d/w his hepatologist. No f/c, sweats, arms are steady, no asterixis, CP, SOB, cough, had some RAMSEY w/ therapy, checked pulse ox and was low but improved w/ rest, no edema, n/v, constipation, has been having 5-6 bowel movements a day, loose, and no other new c/o presently.    Allergies - flexeril  PMH - HCC, nonalcoholic liver cirrhosis, combined heart failure, hypothyroidism, DM-type II, HTN, lumbar stenosis, chronic thrombocytopenia, gastric varices, GERD, hypothyroidism, RAYMOND, OA  PSH - TAVR, back surgery, hand surgery, hip replacement, shoulder surgery, tonsillectomy, TENS  FH - heart disease, RAYMOND, cancer  SocHx - Never smoker, No EtOH      *Review of Systems*  All other systems reviewed are negative except as noted in the HPI     *Vitals*  Date: 3/29/23 - T: 98.1 P: 84 R: 18 BP: 138/78 SpO2: 98% ; Date: 3/29/23 - Wt: 260.5     *Results/Data*  CBC - Date: 3/27/23 WBC: 8.8 HGB: 11.5 HCT: 36.7 PLT: 84 ;   BMP - Date: 3/27/23 Na: 133 K: 4.8 Cl: 107 CO2: 18 BUN: 17 Cr: 1.25 Glu: 118 Ca: 8.0 ;   LFT - Date: 3/27/23 AST: 73 ALT: 37 ALP: 182 TBili: 1.1 ;   Coags - Date: INR: PT:   Other - Date: 9/7/22 Ammonia: 124; 9/14/22 - Ammonia: 98 TSH: 2.95 AFP: 5; 9/23/22 Ammonia: 75 ; 3/29/23 - Ammonia: 123    *Physical Exam*  Gen: (+) NAD, (+) well-appearing  HEENT: (+) normocephalic, (+) MMM  Neck: (+) supple  Lungs: (+) CTAB, (-) wheezes, (-) rales, (-) rhonchi  Heart: (+) RRR, (+) S1 S2, (-) murmurs  Pulses: (+) palpable  Abd: (+) soft, (+) NT, (+) ND, (+) BS+  Ext: (-) edema, (-) deformity  MSK: (-) joint swelling  Skin: (+) warm, (+) dry, (-) rash  Neuro: (+) follows commands, (-) tremor, (+) alert

## 2023-04-05 NOTE — LETTER
Patient: Ryan Slater  : 1955    Encounter Date: 2023    *Provider Impression*  Patient is a 67 year old male who is seen today for management of multiple medical problems  #Weakness / OA / Lumbar stenosis - PT/OT, turmeric 1500mg daily, oxycodone 10mg q6h PRN, acetaminophen 650mg q4h PRN, ibuprofen 600mg q6h PRN  #Cirrhosis / Hepatic encephalopathy - lactulose 20grams/30mL TID, spironolactone 25mg every other day  #RAYMOND - CPAP  #CHF / HLD - carvedilol 3.125mg BID, cholestyramine 4grams daily  #T2DM - glipizide 5mg BID  #Hypothyroidism - levothyroxine 175mcg daily  #GERD / Nausea - zofran 8mg BID and q4h PRN, pantoprazole 40mg daily  #Pruritis / Intertrigo - hydrocortisone TID PRN, add nystatin cream BID x2wk  #Depression - bupropion XL 300mg / 150mg daily  Follow up as needed      *Chief Complaint*  cirrhosis      *History of Present Illness*  Pt is a 68 y/o male w/ PMH as below who presented to the ED and was admitted with hepatic encephalopathy. The patient was treated with rifaximin and lactulose. PT and OT evaluated the patient and recommended moderate intensity. The patient was treated for UTI and prophylactically for SBP with ceftriaxone. The Urine Culture grew Proteus Mirabalis and E. Faecalis which were treated on discharge with macrobid 100 mg every 12 hours and keflex 250 mg every 6 hours from 3/24-3/29 for a complete course of 7 days 3/23-3/29. The patient was previously treated successfully with neomycin, the toxicities and risks of neomycin were discussed and patient is aware. The patient will have close follow up with audiology after discharge and while using neomycin. The patient was hemodynamically stable and ready for discharge to SNF so was d/c to [facility] @ [place]    His ammonia level was improved. He reports that he developed a rash in his folds denies any pain, f/c, sweats, CP, SOB, cough, n/v, constipation, diarrhea, numbness, tingling, reports oxy is helping w/ the pain and denies any  other new c/o presently.    Allergies - flexeril  PMH - HCC, nonalcoholic liver cirrhosis, combined heart failure, hypothyroidism, DM-type II, HTN, lumbar stenosis, chronic thrombocytopenia, gastric varices, GERD, hypothyroidism, RAYMOND, OA  PSH - TAVR, back surgery, hand surgery, hip replacement, shoulder surgery, tonsillectomy, TENS  FH - heart disease, RAYMOND, cancer  SocHx - Never smoker, No EtOH      *Review of Systems*  All other systems reviewed are negative except as noted in the HPI     *Vitals*  Date: 4/5/23 - T: 98.2 P: 82 R: 17 BP: 118/54 SpO2: 96% ; Date: 4/5/23 - Wt: 262     *Results/Data*  CBC - Date: 3/27/23 WBC: 8.8 HGB: 11.5 HCT: 36.7 PLT: 84 ;   BMP - Date: 3/27/23 Na: 133 K: 4.8 Cl: 107 CO2: 18 BUN: 17 Cr: 1.25 Glu: 118 Ca: 8.0 ;   LFT - Date: 3/27/23 AST: 73 ALT: 37 ALP: 182 Tbili: 1.1 ;   Coags - Date: INR: PT:   Other - Date: 9/7/22 Ammonia: 124; 9/14/22 - Ammonia: 98 TSH: 2.95 AFP: 5; 9/23/22 Ammonia: 75 ; 3/29/23 - Ammonia: 123; 3/31/23 - Ammonia: 79    *Physical Exam*  Gen: (+) NAD, (+) well-appearing  HEENT: (+) normocephalic, (+) MMM  Neck: (+) supple  Lungs: (+) CTAB, (-) wheezes, (-) rales, (-) rhonchi  Heart: (+) RRR, (+) S1 S2, (-) murmurs  Pulses: (+) palpable  Abd: (+) soft, (+) NT, (+) ND, (+) BS+  Ext: (-) edema, (-) deformity  MSK: (-) joint swelling  Skin: (+) warm, (+) dry, (-) rash  Neuro: (+) follows commands, (-) tremor, (+) alert      Electronically Signed By: Dain Lewis, ZEFERINO-CNP   4/10/23  1:05 PM

## 2023-04-08 NOTE — PROGRESS NOTES
Resident seen 4/3/23 -- MP    CC: SNF ([facility]) Recheck    : 1955  Admit H&P done 2021 ([facility]), 3/25/2023 ([place]/Epic)  Allergy: Flexeril (intolerance)  Full Code    S: 69 yo man with HFpEF, DM, Depression/anxiety, liver cancer on immunotherapy, admitted for SNF rehab after recent hospitalization for recurrent bout of hepatic encephalopathy. No cp/sob. Med List & Problem List reviewed.    O: VSS AFEB Wt 255# Awake, alert, NAD off O2. Chest cta. Heart rrr, 2 MARIELLE. Ext no c/c/e.    MOCA (11/15/21)     LAB  (3/31/23) Ammonia 79  (3/24/23) Na 133, K 4.4, Alb 2.6, Mg 1.74    A/P:  # Weakness: PT/OT  # Hepatic encephalopathy and Liver cancer on immunotherapy: prn bursts of neomycin 1500 mg q6 hours for 5-6 days effective at lowering ammonia when he refuses lactulose. Continue lactulose 30 ml TID -- ok to hold dose after 4th BM of the day.   # CHF: spironolactone, BB  # DM: glipizide xl to 5 mg daily. Patient and wife refuse metformin.  # Hypothyroidism: 175 mcg LT4  # Pain mgmt: oxycodone 10 mg q 6 h prn pain. Ibuprofen. No tylenol.  # HTN: stable on coreg 3.125 bid.  # Depression and anxiety: appreciate Psych 360 help adjusting Welbutrin XL.

## 2023-04-10 NOTE — PROGRESS NOTES
*Provider Impression*  Patient is a 67 year old male who is seen today for management of multiple medical problems  #Weakness / OA / Lumbar stenosis - PT/OT, turmeric 1500mg daily, oxycodone 10mg q6h PRN, acetaminophen 650mg q4h PRN, ibuprofen 600mg q6h PRN  #Cirrhosis / Hepatic encephalopathy - lactulose 20grams/30mL TID, spironolactone 25mg every other day  #RAYMOND - CPAP  #CHF / HLD - carvedilol 3.125mg BID, cholestyramine 4grams daily  #T2DM - glipizide 5mg BID  #Hypothyroidism - levothyroxine 175mcg daily  #GERD / Nausea - zofran 8mg BID and q4h PRN, pantoprazole 40mg daily  #Pruritis / Intertrigo - hydrocortisone TID PRN, add nystatin cream BID x2wk  #Depression - bupropion XL 300mg / 150mg daily  Follow up as needed      *Chief Complaint*  cirrhosis      *History of Present Illness*  Pt is a 66 y/o male w/ PMH as below who presented to the ED and was admitted with hepatic encephalopathy. The patient was treated with rifaximin and lactulose. PT and OT evaluated the patient and recommended moderate intensity. The patient was treated for UTI and prophylactically for SBP with ceftriaxone. The Urine Culture grew Proteus Mirabalis and E. Faecalis which were treated on discharge with macrobid 100 mg every 12 hours and keflex 250 mg every 6 hours from 3/24-3/29 for a complete course of 7 days 3/23-3/29. The patient was previously treated successfully with neomycin, the toxicities and risks of neomycin were discussed and patient is aware. The patient will have close follow up with audiology after discharge and while using neomycin. The patient was hemodynamically stable and ready for discharge to SNF so was d/c to [facility] @ [place]    His ammonia level was improved. He reports that he developed a rash in his folds denies any pain, f/c, sweats, CP, SOB, cough, n/v, constipation, diarrhea, numbness, tingling, reports oxy is helping w/ the pain and denies any other new c/o presently.    Allergies - flexeril  PMH - HCC,  nonalcoholic liver cirrhosis, combined heart failure, hypothyroidism, DM-type II, HTN, lumbar stenosis, chronic thrombocytopenia, gastric varices, GERD, hypothyroidism, RAYMOND, OA  PSH - TAVR, back surgery, hand surgery, hip replacement, shoulder surgery, tonsillectomy, TENS  FH - heart disease, RAYMOND, cancer  SocHx - Never smoker, No EtOH      *Review of Systems*  All other systems reviewed are negative except as noted in the HPI     *Vitals*  Date: 4/5/23 - T: 98.2 P: 82 R: 17 BP: 118/54 SpO2: 96% ; Date: 4/5/23 - Wt: 262     *Results/Data*  CBC - Date: 3/27/23 WBC: 8.8 HGB: 11.5 HCT: 36.7 PLT: 84 ;   BMP - Date: 3/27/23 Na: 133 K: 4.8 Cl: 107 CO2: 18 BUN: 17 Cr: 1.25 Glu: 118 Ca: 8.0 ;   LFT - Date: 3/27/23 AST: 73 ALT: 37 ALP: 182 Tbili: 1.1 ;   Coags - Date: INR: PT:   Other - Date: 9/7/22 Ammonia: 124; 9/14/22 - Ammonia: 98 TSH: 2.95 AFP: 5; 9/23/22 Ammonia: 75 ; 3/29/23 - Ammonia: 123; 3/31/23 - Ammonia: 79    *Physical Exam*  Gen: (+) NAD, (+) well-appearing  HEENT: (+) normocephalic, (+) MMM  Neck: (+) supple  Lungs: (+) CTAB, (-) wheezes, (-) rales, (-) rhonchi  Heart: (+) RRR, (+) S1 S2, (-) murmurs  Pulses: (+) palpable  Abd: (+) soft, (+) NT, (+) ND, (+) BS+  Ext: (-) edema, (-) deformity  MSK: (-) joint swelling  Skin: (+) warm, (+) dry, (-) rash  Neuro: (+) follows commands, (-) tremor, (+) alert

## 2023-04-10 NOTE — LETTER
Patient: Ryan Slater  : 1955    Encounter Date: 04/10/2023    Resident seen 4/10/23 -- MP    CC: SNF ([facility]) DC Note (34 minutes spent on discharge visit, documentation of need for HHC)    : 1955  Admit H&P done 2021 ([facility]), 3/25/2023 ([place]/Epic)  Allergy: Flexeril (intolerance)  Full Code    S: 69 yo man with HFpEF, DM, Depression/anxiety, liver cancer on immunotherapy, completing SNF rehab after recent hospitalization for recurrent bout of hepatic encephalopathy. No cp/sob. Med List & Problem List reviewed.    O: VSS AFEB Wt 267# (up 2#) Awake, alert, NAD off O2. Chest cta. Heart rrr, 2/6 MARIELLE. Ext no c/c/e.    MOCA (11/15/21)     LAB (4/10/23) Ammonia 123->79->60, Na 133, K 4.4, Alb 2.6, Mg 1.74    A/P:  # Weakness: completes SNF PT/OT today. DC home 23 with skilled home PT/OT for help with transition to home with homebound status. Required due to dx of hepatic encephalopathy, CHF, and DM.  # Hepatic encephalopathy and Liver cancer on immunotherapy: prn bursts of neomycin 1500 mg q6 hours for 5-6 days effective at lowering ammonia when he refuses lactulose. Continue lactulose 30 ml TID -- ok to hold dose after 4th BM of the day.  # CHF: continue spironolactone, BB  # DM: glipizide xl to 5 mg daily. Patient and wife refuse metformin.  # Hypothyroidism: 175 mcg LT4  # Pain mgmt: oxycodone 10 mg q 6 h prn pain. Ibuprofen. No tylenol.  # HTN: stable on coreg 3.125 bid.  # Depression and anxiety: appreciate Psych 360 help adjusting Welbutrin XL.      Electronically Signed By: Geovany Vera MD   23 10:24 PM

## 2023-04-18 NOTE — PROGRESS NOTES
Resident seen 4/10/23 -- MP    CC: SNF ([facility]) DC Note (34 minutes spent on discharge visit, documentation of need for HHC)    : 1955  Admit H&P done 2021 ([facility]), 3/25/2023 ([place]/Epic)  Allergy: Flexeril (intolerance)  Full Code    S: 69 yo man with HFpEF, DM, Depression/anxiety, liver cancer on immunotherapy, completing SNF rehab after recent hospitalization for recurrent bout of hepatic encephalopathy. No cp/sob. Med List & Problem List reviewed.    O: VSS AFEB Wt 267# (up 2#) Awake, alert, NAD off O2. Chest cta. Heart rrr, 2/6 MARIELLE. Ext no c/c/e.    MOCA (11/15/21)     LAB (4/10/23) Ammonia 123->79->60, Na 133, K 4.4, Alb 2.6, Mg 1.74    A/P:  # Weakness: completes SNF PT/OT today. DC home 23 with skilled home PT/OT for help with transition to home with homebound status. Required due to dx of hepatic encephalopathy, CHF, and DM.  # Hepatic encephalopathy and Liver cancer on immunotherapy: prn bursts of neomycin 1500 mg q6 hours for 5-6 days effective at lowering ammonia when he refuses lactulose. Continue lactulose 30 ml TID -- ok to hold dose after 4th BM of the day.  # CHF: continue spironolactone, BB  # DM: glipizide xl to 5 mg daily. Patient and wife refuse metformin.  # Hypothyroidism: 175 mcg LT4  # Pain mgmt: oxycodone 10 mg q 6 h prn pain. Ibuprofen. No tylenol.  # HTN: stable on coreg 3.125 bid.  # Depression and anxiety: appreciate Psych 360 help adjusting Welbutrin XL.

## 2023-04-18 NOTE — PROGRESS NOTES
Subjective   Patient ID: Ryan Slater is a 68 y.o. male who presents for home from rehab (Discharged 4/12/23 from [facility] there due to mobility issues for strengthening, hosp 3/22, to [facility] 3/25 approx. /Doing OK, tired Was getting something for itching at [facility] wondering if they can get something Also carvedilol falls out of pill dispenser, company suggested getting an encapsulated formulation/Also onc is trying to get him on a chemo med instead of infusions. Tumors are growing/).    HPI   Patient feels tired  No CP SOB edema  Weight is stable  Strength is better since getting out of rehab   is back home again  Wife is making him walk is much as possible and not use the wheelchair  Review of Systems   Constitutional:  Negative for unexpected weight change.   HENT:  Negative for congestion and ear discharge.    Cardiovascular:  Negative for chest pain and palpitations.   Gastrointestinal:  Negative for constipation and vomiting.   All other systems reviewed and are negative.      Objective   /56   Pulse 73   Wt 121 kg (266 lb 6.4 oz)   SpO2 98%   BMI 40.51 kg/m²     Physical Exam  HENT:      Head: Normocephalic and atraumatic.      Nose: Nose normal.      Mouth/Throat:      Mouth: Mucous membranes are moist.      Pharynx: No oropharyngeal exudate.   Eyes:      Extraocular Movements: Extraocular movements intact.      Conjunctiva/sclera: Conjunctivae normal.      Pupils: Pupils are equal, round, and reactive to light.   Cardiovascular:      Rate and Rhythm: Normal rate and regular rhythm.   Pulmonary:      Effort: Pulmonary effort is normal.   Abdominal:      General: There is no distension.      Palpations: Abdomen is soft.   Musculoskeletal:      Cervical back: Normal range of motion and neck supple.   Lymphadenopathy:      Cervical: No cervical adenopathy.   Neurological:      General: No focal deficit present.      Mental Status: He is alert.   Psychiatric:         Attention and Perception:  Attention normal.         Speech: Speech normal.         Behavior: Behavior is cooperative.         Assessment/Plan   Diagnoses and all orders for this visit:  Pruritus of skin  Comments:  Also add hydrocortisone cream OTC 3 times daily as needed  Orders:  -     hydrOXYzine HCL (Atarax) 25 mg tablet; Take 1 tablet (25 mg) by mouth in the morning and 1 tablet (25 mg) in the evening and 1 tablet (25 mg) before bedtime.  Essential hypertension, benign  Comments:  Controlled  Orders:  -     carvedilol (Coreg) 3.125 mg tablet; Take 1 tablet (3.125 mg) by mouth in the morning and 1 tablet (3.125 mg) in the evening. Take with meals. Please encapsulate.  Hepatic encephalopathy (CMS/HCC)  Comments:  Not currently an issue for patient although he is not taking lactulose or neomycin currently and rifaximin is too expensive    Recheck 3 months sooner if any problems arise do follow-up with oncology as scheduled

## 2023-04-24 PROBLEM — N39.0 URINARY TRACT INFECTION: Status: RESOLVED | Noted: 2023-01-01 | Resolved: 2023-01-01

## 2023-04-24 PROBLEM — M47.816 LUMBAR SPONDYLOSIS: Status: ACTIVE | Noted: 2023-01-01

## 2023-04-25 NOTE — TELEPHONE ENCOUNTER
Wife returned call has tried OTC hydrocortisone creams, moisturizing lotions, chlorestramine without relief, itching is due to liver failure. All noted on prior auth. Advised we will submit and see what happens.

## 2023-04-25 NOTE — TELEPHONE ENCOUNTER
LM for wife to call, insurance wants to know what else has been tried and failed for the itching, they don't want to cover the Atarax/ Hydroxyzine.

## 2023-05-12 PROBLEM — M25.572 ACUTE LEFT ANKLE PAIN: Status: ACTIVE | Noted: 2023-01-01

## 2023-05-12 NOTE — PROGRESS NOTES
Discharge Facility:[facility]  Discharge Diagnosis:Primary malignant neoplasm of liver  Admission Date: 5-9-23  Discharge Date: 5-11-23    PCP Appointment Date:  PtCristian cuevast.   Specialist Appointment Date:  Unknown   Hospital Encounter and Summary: Linked   See discharge assessment below for further details  Engagement  Call Start Time: 1302 (5/12/2023  1:06 PM)    Medications  Medications reviewed with patient/caregiver?: Yes (5/12/2023  1:06 PM)  Is the patient having any side effects they believe may be caused by any medication additions or changes?: No (5/12/2023  1:06 PM)  Does the patient have all medications ordered at discharge?: Yes (5/12/2023  1:06 PM)  Is the patient taking all medications as directed (includes completed medication regime)?: Yes (5/12/2023  1:06 PM)  Care Management Interventions: Provided patient education (5/12/2023  1:06 PM)    Appointments  Does the patient have a primary care provider?: Yes (5/12/2023  1:06 PM)  Care Management Interventions: Educated patient on importance of making appointment; Advised patient to make appointment (5/12/2023  1:06 PM)  Has the patient kept scheduled appointments due by today?: Yes (5/12/2023  1:06 PM)    Self Management  Has home health visited the patient within 72 hours of discharge?: Not applicable (5/12/2023  1:06 PM)    Patient Teaching  Does the patient have access to their discharge instructions?: Yes (5/12/2023  1:06 PM)  Care Management Interventions: Reviewed instructions with patient (5/12/2023  1:06 PM)  What is the patient's perception of their health status since discharge?: Improving (5/12/2023  1:06 PM)      Joanne Berrios LPN

## 2023-05-15 NOTE — TELEPHONE ENCOUNTER
PC to pt. Wife to see if they would RTC for TCM, states his chemo has just started again and it is difficult enough to get him there, just wants to see how he does with this round he has bounced back before, not sure what he will do this time. She will call as needed.

## 2023-05-24 NOTE — LETTER
Patient: Ryan Slater  : 1955    Encounter Date: 2023    *Provider Impression*  Patient is a 68 year old male who is seen today for management of multiple medical problems  #Weakness / OA / Lumbar stenosis - PT/OT, turmeric 2500mg daily, oxycodone 10mg q6h PRN, acetaminophen 650mg q4h PRN  #Cirrhosis / Hepatic encephalopathy / SBP -  cholestyarmine 4mg TID, cipro 250mg daily until   #RAYMOND - CPAP  #CHF / HLD - cholestyramine 4grams daily  #T2DM - glipizide 5mg BID  #Hypothyroidism - levothyroxine 175mcg daily  #GERD / Nausea - zofran 8mg q8h PRN  #Depression - bupropion XL 300mg / 150mg daily, mirtazapine 45mg daily  Follow up as needed      *Chief Complaint*  cirrhosis      *History of Present Illness*  Pt is a 69 y/o male w/ PMH as below who presented to the hospital because of confusion, generalized weakness, malaise, and anorexia of a few days duration. He was admitted for acute metabolic/hepatic encephalopathy.  GI followed and recommended oral lactulose 4 times daily to titrate to 3-4 bowel movements a day.  He was placed on rifaximin during the hospitalization however there is reported that the insurance denied to cover the medication after multiple attempts by the gastroenterology team.  Rifaximin was discontinued on discharge. A paracentesis was consistent with spontaneous bacterial peritonitis.  The patient was treated with Rocephin during the hospitalization and was transitioned to Cipro to complete a 2-week course. He was consulted due to acute renal failure.  After evaluation it was felt the acute renal failure is related to his chemotherapy.  The nephrologist advised that there is no need for nephrology follow-up as an outpatient however the patient should follow-up with his oncologist. He was then d/c to [facility] for rehab.    He is seen lying in bed today and reports no f/c, sweats, CP, SOB, cough, n/v, constipation, diarrhea, LUTS, no emesis, and denies any other new c/o  presently.    Allergies - flexeril  PMH - HCC, nonalcoholic liver cirrhosis, combined heart failure, hypothyroidism, DM-type II, HTN, lumbar stenosis, chronic thrombocytopenia, gastric varices, GERD, hypothyroidism, RAYMOND, OA  PSH - TAVR, back surgery, hand surgery, hip replacement, shoulder surgery, tonsillectomy, TENS  FH - heart disease, RAYMOND, cancer  SocHx - Never smoker, No EtOH      *Review of Systems*  All other systems reviewed are negative except as noted in the HPI     *Vitals*  Date: 5/24/23 - T: 97.1 P: 84 R: 18 BP: 128/60 SpO2: 95% ; Date: 5/24/23 - Wt: 265.2     *Results/Data*  CBC - Date: 5/24/23 WBC: 10.7 HGB: 7.9 HCT: 24.5 PLT: 114 ;   BMP - Date: 5/24/23 Na: 131 K: 4.4 Cl: 101 CO2: 21 BUN: 64 Cr: 4.05 Glu: 45 Ca: 8.2 ;   LFT - Date: 5/24/23 AST: 99 ALT: 77 ALP: 369 Tbili: 5.5 ;   Coags - Date: INR: PT:   Other - Date: 9/7/22 Ammonia: 124; 9/14/22 - Ammonia: 98 TSH: 2.95 AFP: 5; 9/23/22 Ammonia: 75 ; 3/29/23 - Ammonia: 123; 3/31/23 - Ammonia: 79; 5/24/23 - Ammonia: 84    *Physical Exam*  Gen: (+) NAD, (+) well-appearing  HEENT: (+) normocephalic, (+) MMM  Neck: (+) supple  Lungs: (+) CTAB, (-) wheezes, (-) rales, (-) rhonchi  Heart: (+) RRR, (+) S1 S2, (-) murmurs  Pulses: (+) palpable  Abd: (+) soft, (+) NT, (+) ND, (+) BS+  Ext: (-) edema, (-) deformity  MSK: (-) joint swelling  Skin: (+) warm, (+) dry, (-) rash  Neuro: (+) follows commands, (-) tremor, (+) alert      Electronically Signed By: Dain Lewis, APRN-CNP   5/29/23  8:36 PM

## 2023-05-25 NOTE — LETTER
Patient: Ryan Slater  : 1955    Encounter Date: 2023    Ryan Slater is a 68 y.o. male with Chief Complaint of SNF ([facility]) H&P    Resident seen 23 -- MP    CC: SNF ([facility]) H&P    : 1955  Admit H&P done 2021 ([place]), 3/25/2023 ([facility])  Allergy: Flexeril (intolerance)  DNR-CCA    S: 69 yo man with HFpEF, DM, Depression/anxiety, liver cancer on immunotherapy, completing SNF rehab after recent hospitalization for recurrent bout of hepatic encephalopathy. No cp/sob. Med List & Problem List reviewed.    O: VSS AFEB Wt 265# (down 2#) Awake, alert, pleasantly confused, NAD off O2, jaundiced without asterixis. Chest cta. Heart rrr, 2/6 MARIELLE. Ext no c/c/e.    MOCA (11/15/21)     LAB (4/10/23) Ammonia 123->79->60, Na 133, K 4.4, Alb 2.6, Mg 1.74    A/P:  # Weakness: SNF PT/OT.  # Recent SBP: s/p paracentesis. Treated with Rocephin and transitioned to Cipro x 2 weeks.  # Hepatic encephalopathy and Liver cirrhosis and cancer on immunotherapy: prn bursts of neomycin 1500 mg q6 hours for 5-6 days effective at lowering ammonia when he refuses lactulose. Continue lactulose 30 ml TID -- ok to hold dose after 4th BM of the day. Rifamixin coverage denied by insurance. Cholestyramine 4gm TID.  # ARF d/t chemo: f/u oncology.  # CHF: euvolemic OFF spironolactone.  # DM: stable OFF glipizide. Family refuses metformin.  # Hypothyroidism: 175 mcg LT4  # Pain mgmt: oxycodone 10 mg q 6 h prn pain. Ibuprofen. No tylenol.  # HTN: stable OFF coreg.  # Depression and anxiety: appreciate Psych 360 help adjusting Welbutrin  mg daily. Mirtazepine 45 mg daily.    Past Surgical History:   Procedure Laterality Date   • BACK SURGERY  2014    Back Surgery   • CT ABDOMEN PELVIS ANGIOGRAM W AND/OR WO IV CONTRAST  10/20/2020    CT ABDOMEN PELVIS ANGIOGRAM W AND/OR WO IV CONTRAST 10/20/2020 [facility]   • CT GUIDED PERCUTANEOUS BIOPSY BONE DEEP  10/2/2020    CT GUIDED PERCUTANEOUS BIOPSY BONE  DEEP 10/2/2020 [facility]   • HAND SURGERY  02/18/2014    Hand Surgery                                                                                                                                                         • SHOULDER SURGERY  02/18/2014    Shoulder Surgery   • TONSILLECTOMY  02/18/2014    Tonsillectomy   • TOTAL HIP ARTHROPLASTY  05/27/2014    Hip Replacement   • US GUIDED ABDOMINAL PARACENTESIS  3/8/2023    US GUIDED ABDOMINAL PARACENTESIS [facility]   • US GUIDED ABDOMINAL PARACENTESIS  5/17/2023    US GUIDED ABDOMINAL PARACENTESIS 5/17/2023 [facility] US      Social History     Socioeconomic History   • Marital status:      Spouse name: Not on file   • Number of children: Not on file   • Years of education: Not on file   • Highest education level: Not on file   Occupational History   • Not on file   Tobacco Use   • Smoking status: Never   • Smokeless tobacco: Never   Vaping Use   • Vaping status: Never Used   Substance and Sexual Activity   • Alcohol use: Never   • Drug use: Never   • Sexual activity: Not on file   Other Topics Concern   • Not on file   Social History Narrative   • Not on file     Social Determinants of Health     Financial Resource Strain: Not on file   Food Insecurity: Not on file   Transportation Needs: Not on file   Physical Activity: Not on file   Stress: Not on file   Social Connections: Not on file   Intimate Partner Violence: Not on file   Housing Stability: Not on file     Past Medical History:   Diagnosis Date   • Acute maxillary sinusitis, unspecified 04/03/2018    Acute maxillary sinusitis   • Essential (primary) hypertension 10/20/2013    Hypertension   • Hyperlipidemia, unspecified 10/20/2013    Hyperlipidemia   • Itching of ear 03/14/2023   • Nonrheumatic aortic (valve) stenosis 04/30/2020    Severe aortic stenosis   • Personal history of other diseases of the circulatory system 05/04/2020    History of aortic valve stenosis   • Personal history of other diseases  of the musculoskeletal system and connective tissue     Personal history of arthritis   • Personal history of other mental and behavioral disorders     History of depression   • Unspecified otitis externa, left ear 12/16/2020    Otitis externa, left   • Urinary tract infection 04/24/2023      Family History   Problem Relation Name Age of Onset   • Cancer Mother     • Breast cancer Mother     • Other (cardiac disorder) Father     • Cancer Father     • Prostate cancer Father     • Sleep apnea Other Sibling         Review of Systems   Constitutional:  Negative for chills, fatigue and fever.   HENT:  Negative for rhinorrhea and sore throat.    Eyes:  Negative for pain and redness.   Respiratory:  Negative for cough and shortness of breath.    Cardiovascular:  Negative for chest pain and palpitations.   Gastrointestinal:  Negative for abdominal pain and blood in stool.   Endocrine: Negative for polydipsia and polyuria.   Genitourinary:  Negative for dysuria and hematuria.   Musculoskeletal:  Negative for back pain and neck stiffness.   Skin:  Negative for rash and wound.   Allergic/Immunologic: Negative for environmental allergies and food allergies.   Neurological:  Positive for weakness. Negative for headaches.   Hematological:  Negative for adenopathy. Does not bruise/bleed easily.   Psychiatric/Behavioral:  Negative for hallucinations and suicidal ideas.       There were no vitals taken for this visit.  Physical Exam  Vitals reviewed.   Constitutional:       General: He is not in acute distress.     Appearance: He is not ill-appearing.   HENT:      Head: Normocephalic and atraumatic.      Right Ear: Tympanic membrane normal.      Left Ear: Tympanic membrane normal.      Nose: No congestion or rhinorrhea.      Mouth/Throat:      Pharynx: No oropharyngeal exudate or posterior oropharyngeal erythema.   Eyes:      Extraocular Movements: Extraocular movements intact.      Conjunctiva/sclera: Conjunctivae normal.       Pupils: Pupils are equal, round, and reactive to light.   Cardiovascular:      Rate and Rhythm: Normal rate and regular rhythm.      Heart sounds: No murmur heard.     No friction rub. No gallop.   Pulmonary:      Effort: Pulmonary effort is normal.      Breath sounds: Normal breath sounds. No wheezing, rhonchi or rales.   Abdominal:      General: There is no distension.      Palpations: Abdomen is soft.      Tenderness: There is no abdominal tenderness. There is no guarding or rebound.   Musculoskeletal:         General: No swelling or deformity.      Cervical back: Normal range of motion and neck supple.      Right lower leg: No edema.      Left lower leg: No edema.   Skin:     Capillary Refill: Capillary refill takes less than 2 seconds.      Coloration: Skin is jaundiced.      Findings: No rash.   Neurological:      General: No focal deficit present.      Mental Status: He is alert.      Motor: Weakness present.   Psychiatric:         Mood and Affect: Mood normal.         Behavior: Behavior normal.       Lab Results   Component Value Date    WBC 6.5 06/02/2023    HGB 8.5 (L) 06/02/2023    HCT 27.2 (L) 06/02/2023    MCV 91 06/02/2023    PLT 74 (L) 06/02/2023     Lab Results   Component Value Date    CHOL 209 (H) 04/01/2020    CHOL 186 05/07/2019    CHOL 192 05/25/2018     Lab Results   Component Value Date    HDL 44.5 04/01/2020    HDL 49.7 05/07/2019    HDL 46.0 05/25/2018     No results found for: LDLCALC  Lab Results   Component Value Date    TRIG 149 04/01/2020    TRIG 202 (H) 05/07/2019    TRIG 363 (H) 05/25/2018     No components found for: CHOLHDL  Lab Results   Component Value Date    HGBA1C 7.5 (A) 09/13/2022       Assessment/Plan  Problem List Items Addressed This Visit          Nervous    Hepatic encephalopathy (CMS/HCC)       Digestive    Spontaneous bacterial peritonitis (CMS/HCC)       Endocrine/Metabolic    Type 2 diabetes mellitus without complication (CMS/HCC)         Electronically Signed By:  Geovany Vera MD   6/3/23  3:44 PM

## 2023-05-26 PROBLEM — K65.2 SPONTANEOUS BACTERIAL PERITONITIS (MULTI): Status: ACTIVE | Noted: 2023-01-01

## 2023-05-26 NOTE — PROGRESS NOTES
Ryan Slater is a 68 y.o. male with Chief Complaint of SNF ([place]) H&P    Resident seen 23 -- MP    CC: SNF ([place]) H&P    : 1955  Admit H&P done 2021 ([place]), 3/25/2023 ([place]/[facility])  Allergy: Flexeril (intolerance)  DNR-CCA    S: 69 yo man with HFpEF, DM, Depression/anxiety, liver cancer on immunotherapy, completing SNF rehab after recent hospitalization for recurrent bout of hepatic encephalopathy. No cp/sob. Med List & Problem List reviewed.    O: VSS AFEB Wt 265# (down 2#) Awake, alert, pleasantly confused, NAD off O2, jaundiced without asterixis. Chest cta. Heart rrr,  MARIELLE. Ext no c/c/e.    MOCA (11/15/21)     LAB (4/10/23) Ammonia 123->79->60, Na 133, K 4.4, Alb 2.6, Mg 1.74    A/P:  # Weakness: SNF PT/OT.  # Recent SBP: s/p paracentesis. Treated with Rocephin and transitioned to Cipro x 2 weeks.  # Hepatic encephalopathy and Liver cirrhosis and cancer on immunotherapy: prn bursts of neomycin 1500 mg q6 hours for 5-6 days effective at lowering ammonia when he refuses lactulose. Continue lactulose 30 ml TID -- ok to hold dose after 4th BM of the day. Rifamixin coverage denied by insurance. Cholestyramine 4gm TID.  # ARF d/t chemo: f/u oncology.  # CHF: euvolemic OFF spironolactone.  # DM: stable OFF glipizide. Family refuses metformin.  # Hypothyroidism: 175 mcg LT4  # Pain mgmt: oxycodone 10 mg q 6 h prn pain. Ibuprofen. No tylenol.  # HTN: stable OFF coreg.  # Depression and anxiety: appreciate Psych 360 help adjusting Welbutrin  mg daily. Mirtazepine 45 mg daily.    Past Surgical History:   Procedure Laterality Date    BACK SURGERY  2014    Back Surgery    CT ABDOMEN PELVIS ANGIOGRAM W AND/OR WO IV CONTRAST  10/20/2020    CT ABDOMEN PELVIS ANGIOGRAM W AND/OR WO IV CONTRAST 10/20/2020 [facility]    CT GUIDED PERCUTANEOUS BIOPSY BONE DEEP  10/2/2020    CT GUIDED PERCUTANEOUS BIOPSY BONE DEEP 10/2/2020 [facility]    HAND SURGERY  2014    Hand Surgery                                                                                                                                                           SHOULDER SURGERY  02/18/2014    Shoulder Surgery    TONSILLECTOMY  02/18/2014    Tonsillectomy    TOTAL HIP ARTHROPLASTY  05/27/2014    Hip Replacement    US GUIDED ABDOMINAL PARACENTESIS  3/8/2023    US GUIDED ABDOMINAL PARACENTESIS [facility]    US GUIDED ABDOMINAL PARACENTESIS  5/17/2023    US GUIDED ABDOMINAL PARACENTESIS 5/17/2023 [facility]      Social History     Socioeconomic History    Marital status:      Spouse name: Not on file    Number of children: Not on file    Years of education: Not on file    Highest education level: Not on file   Occupational History    Not on file   Tobacco Use    Smoking status: Never    Smokeless tobacco: Never   Vaping Use    Vaping status: Never Used   Substance and Sexual Activity    Alcohol use: Never    Drug use: Never    Sexual activity: Not on file   Other Topics Concern    Not on file   Social History Narrative    Not on file     Social Determinants of Health     Financial Resource Strain: Not on file   Food Insecurity: Not on file   Transportation Needs: Not on file   Physical Activity: Not on file   Stress: Not on file   Social Connections: Not on file   Intimate Partner Violence: Not on file   Housing Stability: Not on file     Past Medical History:   Diagnosis Date    Acute maxillary sinusitis, unspecified 04/03/2018    Acute maxillary sinusitis    Essential (primary) hypertension 10/20/2013    Hypertension    Hyperlipidemia, unspecified 10/20/2013    Hyperlipidemia    Itching of ear 03/14/2023    Nonrheumatic aortic (valve) stenosis 04/30/2020    Severe aortic stenosis    Personal history of other diseases of the circulatory system 05/04/2020    History of aortic valve stenosis    Personal history of other diseases of the musculoskeletal system and connective tissue     Personal history of arthritis    Personal  history of other mental and behavioral disorders     History of depression    Unspecified otitis externa, left ear 12/16/2020    Otitis externa, left    Urinary tract infection 04/24/2023      Family History   Problem Relation Name Age of Onset    Cancer Mother      Breast cancer Mother      Other (cardiac disorder) Father      Cancer Father      Prostate cancer Father      Sleep apnea Other Sibling         Review of Systems   Constitutional:  Negative for chills, fatigue and fever.   HENT:  Negative for rhinorrhea and sore throat.    Eyes:  Negative for pain and redness.   Respiratory:  Negative for cough and shortness of breath.    Cardiovascular:  Negative for chest pain and palpitations.   Gastrointestinal:  Negative for abdominal pain and blood in stool.   Endocrine: Negative for polydipsia and polyuria.   Genitourinary:  Negative for dysuria and hematuria.   Musculoskeletal:  Negative for back pain and neck stiffness.   Skin:  Negative for rash and wound.   Allergic/Immunologic: Negative for environmental allergies and food allergies.   Neurological:  Positive for weakness. Negative for headaches.   Hematological:  Negative for adenopathy. Does not bruise/bleed easily.   Psychiatric/Behavioral:  Negative for hallucinations and suicidal ideas.       There were no vitals taken for this visit.  Physical Exam  Vitals reviewed.   Constitutional:       General: He is not in acute distress.     Appearance: He is not ill-appearing.   HENT:      Head: Normocephalic and atraumatic.      Right Ear: Tympanic membrane normal.      Left Ear: Tympanic membrane normal.      Nose: No congestion or rhinorrhea.      Mouth/Throat:      Pharynx: No oropharyngeal exudate or posterior oropharyngeal erythema.   Eyes:      Extraocular Movements: Extraocular movements intact.      Conjunctiva/sclera: Conjunctivae normal.      Pupils: Pupils are equal, round, and reactive to light.   Cardiovascular:      Rate and Rhythm: Normal rate and  regular rhythm.      Heart sounds: No murmur heard.     No friction rub. No gallop.   Pulmonary:      Effort: Pulmonary effort is normal.      Breath sounds: Normal breath sounds. No wheezing, rhonchi or rales.   Abdominal:      General: There is no distension.      Palpations: Abdomen is soft.      Tenderness: There is no abdominal tenderness. There is no guarding or rebound.   Musculoskeletal:         General: No swelling or deformity.      Cervical back: Normal range of motion and neck supple.      Right lower leg: No edema.      Left lower leg: No edema.   Skin:     Capillary Refill: Capillary refill takes less than 2 seconds.      Coloration: Skin is jaundiced.      Findings: No rash.   Neurological:      General: No focal deficit present.      Mental Status: He is alert.      Motor: Weakness present.   Psychiatric:         Mood and Affect: Mood normal.         Behavior: Behavior normal.       Lab Results   Component Value Date    WBC 6.5 06/02/2023    HGB 8.5 (L) 06/02/2023    HCT 27.2 (L) 06/02/2023    MCV 91 06/02/2023    PLT 74 (L) 06/02/2023     Lab Results   Component Value Date    CHOL 209 (H) 04/01/2020    CHOL 186 05/07/2019    CHOL 192 05/25/2018     Lab Results   Component Value Date    HDL 44.5 04/01/2020    HDL 49.7 05/07/2019    HDL 46.0 05/25/2018     No results found for: LDLCALC  Lab Results   Component Value Date    TRIG 149 04/01/2020    TRIG 202 (H) 05/07/2019    TRIG 363 (H) 05/25/2018     No components found for: CHOLHDL  Lab Results   Component Value Date    HGBA1C 7.5 (A) 09/13/2022       Assessment/Plan   Problem List Items Addressed This Visit          Nervous    Hepatic encephalopathy (CMS/HCC)       Digestive    Spontaneous bacterial peritonitis (CMS/HCC)       Endocrine/Metabolic    Type 2 diabetes mellitus without complication (CMS/HCC)

## 2023-05-30 NOTE — PROGRESS NOTES
*Provider Impression*  Patient is a 68 year old male who is seen today for management of multiple medical problems  #Weakness / OA / Lumbar stenosis - PT/OT, turmeric 2500mg daily, oxycodone 10mg q6h PRN, acetaminophen 650mg q4h PRN  #Cirrhosis / Hepatic encephalopathy / SBP -  cipro 250mg daily until 6/7, start torsemide 20mg x1, then 20mg daily, lactulose 20g/30mL BID - hold for >3BM/day, strict I&O, daily wts, check CBC, renal, hepatic on 5/31; consider addition of aldactone pending labs  #RAYMOND - CPAP  #HLD - cholestyramine 4grams daily  #T2DM - glipizide 5mg BID  #Hypothyroidism - levothyroxine 175mcg daily  #GERD / Nausea - zofran 8mg q8h PRN  #Depression - bupropion XL 300mg / 150mg daily, mirtazapine 45mg daily  Follow up as needed      *Chief Complaint*  cirrhosis      *History of Present Illness*  Pt is a 67 y/o male w/ PMH as below who presented to the hospital because of confusion, generalized weakness, malaise, and anorexia of a few days duration. He was admitted for acute metabolic/hepatic encephalopathy.  GI followed and recommended oral lactulose 4 times daily to titrate to 3-4 bowel movements a day.  He was placed on rifaximin during the hospitalization however there is reported that the insurance denied to cover the medication after multiple attempts by the gastroenterology team.  Rifaximin was discontinued on discharge. A paracentesis was consistent with spontaneous bacterial peritonitis.  The patient was treated with Rocephin during the hospitalization and was transitioned to Cipro to complete a 2-week course. He was consulted due to acute renal failure.  After evaluation it was felt the acute renal failure is related to his chemotherapy.  The nephrologist advised that there is no need for nephrology follow-up as an outpatient however the patient should follow-up with his oncologist. He was then d/c to [facility] for rehab.    Nursing staff report there was a plan of care meeting and pt and  family requesting to resume aldacone, lactulose, d/c the acetaminophen.     He is seen sitting up in his room today and reports appetite is worse, more fatigued, no f/c, but cold all the time, CP, SOB, edema, some ascies but stable, no n/v, constipation, diarrhea, denies any other new c/o presently.    Discussion with pt and wife. Due to his worsening renal function we discuss risks of aldactone. They are in agreement w/ torsemide over the weekend and considering addition of aldactone upon repeat labs.     Allergies - flexeril  PMH - HCC, nonalcoholic liver cirrhosis, combined heart failure, hypothyroidism, DM-type II, HTN, lumbar stenosis, chronic thrombocytopenia, gastric varices, GERD, hypothyroidism, RAYMOND, OA  PSH - TAVR, back surgery, hand surgery, hip replacement, shoulder surgery, tonsillectomy, TENS  FH - heart disease, RAYMOND, cancer  SocHx - Never smoker, No EtOH      *Review of Systems*  All other systems reviewed are negative except as noted in the HPI     *Vitals*  Date: 5/24/23 - T: 97.1 P: 84 R: 18 BP: 128/60 SpO2: 95% ; Date: 5/24/23 - Wt: 265.2     *Results/Data*  CBC - Date: 5/24/23 WBC: 10.7 HGB: 7.9 HCT: 24.5 PLT: 114 ;   BMP - Date: 5/24/23 Na: 131 K: 4.4 Cl: 101 CO2: 21 BUN: 64 Cr: 4.05 Glu: 45 Ca: 8.2 ;   LFT - Date: 5/24/23 AST: 99 ALT: 77 ALP: 369 Tbili: 5.5 ;   Coags - Date: INR: PT:   Other - Date: 9/7/22 Ammonia: 124; 9/14/22 - Ammonia: 98 TSH: 2.95 AFP: 5; 9/23/22 Ammonia: 75 ; 3/29/23 - Ammonia: 123; 3/31/23 - Ammonia: 79; 5/24/23 - Ammonia: 84    *Physical Exam*  Gen: (+) NAD, (+) well-appearing  HEENT: (+) normocephalic, (+) MMM  Neck: (+) supple  Lungs: (+) CTAB, (-) wheezes, (-) rales, (-) rhonchi  Heart: (+) RRR, (+) S1 S2, (-) murmurs  Pulses: (+) palpable  Abd: (+) soft, (+) NT, (+) ND, (+) BS+  Ext: (-) edema, (-) deformity  MSK: (-) joint swelling  Skin: (+) warm, (+) dry, (-) rash  Neuro: (+) follows commands, (-) tremor, (+) alert

## 2023-05-30 NOTE — PROGRESS NOTES
*Provider Impression*  Patient is a 68 year old male who is seen today for management of multiple medical problems  #Weakness / OA / Lumbar stenosis - PT/OT, turmeric 2500mg daily, oxycodone 10mg q6h PRN, acetaminophen 650mg q4h PRN  #Cirrhosis / Hepatic encephalopathy / SBP -  cholestyarmine 4mg TID, cipro 250mg daily until 6/7  #RAYMOND - CPAP  #CHF / HLD - cholestyramine 4grams daily  #T2DM - glipizide 5mg BID  #Hypothyroidism - levothyroxine 175mcg daily  #GERD / Nausea - zofran 8mg q8h PRN  #Depression - bupropion XL 300mg / 150mg daily, mirtazapine 45mg daily  Follow up as needed      *Chief Complaint*  cirrhosis      *History of Present Illness*  Pt is a 67 y/o male w/ PMH as below who presented to the hospital because of confusion, generalized weakness, malaise, and anorexia of a few days duration. He was admitted for acute metabolic/hepatic encephalopathy.  GI followed and recommended oral lactulose 4 times daily to titrate to 3-4 bowel movements a day.  He was placed on rifaximin during the hospitalization however there is reported that the insurance denied to cover the medication after multiple attempts by the gastroenterology team.  Rifaximin was discontinued on discharge. A paracentesis was consistent with spontaneous bacterial peritonitis.  The patient was treated with Rocephin during the hospitalization and was transitioned to Cipro to complete a 2-week course. He was consulted due to acute renal failure.  After evaluation it was felt the acute renal failure is related to his chemotherapy.  The nephrologist advised that there is no need for nephrology follow-up as an outpatient however the patient should follow-up with his oncologist. He was then d/c to [facility] for rehab.    He is seen lying in bed today and reports no f/c, sweats, CP, SOB, cough, n/v, constipation, diarrhea, LUTS, no emesis, and denies any other new c/o presently.    Allergies - flexeril  PMH - HCC, nonalcoholic liver cirrhosis,  combined heart failure, hypothyroidism, DM-type II, HTN, lumbar stenosis, chronic thrombocytopenia, gastric varices, GERD, hypothyroidism, RAYMOND, OA  PSH - TAVR, back surgery, hand surgery, hip replacement, shoulder surgery, tonsillectomy, TENS  FH - heart disease, RAYMOND, cancer  SocHx - Never smoker, No EtOH      *Review of Systems*  All other systems reviewed are negative except as noted in the HPI     *Vitals*  Date: 5/24/23 - T: 97.1 P: 84 R: 18 BP: 128/60 SpO2: 95% ; Date: 5/24/23 - Wt: 265.2     *Results/Data*  CBC - Date: 5/24/23 WBC: 10.7 HGB: 7.9 HCT: 24.5 PLT: 114 ;   BMP - Date: 5/24/23 Na: 131 K: 4.4 Cl: 101 CO2: 21 BUN: 64 Cr: 4.05 Glu: 45 Ca: 8.2 ;   LFT - Date: 5/24/23 AST: 99 ALT: 77 ALP: 369 Tbili: 5.5 ;   Coags - Date: INR: PT:   Other - Date: 9/7/22 Ammonia: 124; 9/14/22 - Ammonia: 98 TSH: 2.95 AFP: 5; 9/23/22 Ammonia: 75 ; 3/29/23 - Ammonia: 123; 3/31/23 - Ammonia: 79; 5/24/23 - Ammonia: 84    *Physical Exam*  Gen: (+) NAD, (+) well-appearing  HEENT: (+) normocephalic, (+) MMM  Neck: (+) supple  Lungs: (+) CTAB, (-) wheezes, (-) rales, (-) rhonchi  Heart: (+) RRR, (+) S1 S2, (-) murmurs  Pulses: (+) palpable  Abd: (+) soft, (+) NT, (+) ND, (+) BS+  Ext: (-) edema, (-) deformity  MSK: (-) joint swelling  Skin: (+) warm, (+) dry, (-) rash  Neuro: (+) follows commands, (-) tremor, (+) alert

## 2023-06-01 NOTE — LETTER
Patient: Ryan Slater  : 1955    Encounter Date: 2023    Resident seen 23 -- MP    CC: SNF ([place]) Recheck    : 1955  Admit H&P done 2021 ([place]), 3/25/2023 ([place]/[facility])  Allergy: Flexeril (intolerance)  DNR-CCA    S: 67 yo man with HFpEF, DM, Depression/anxiety, liver cancer on immunotherapy, with recent SBP. No sob but interval CP -- improved after NTG but NOT immediately. Med List & Problem List reviewed.    O: VSS AFEB Wt 258# (down 7#) Awake, alert, pleasantly confused, NAD off O2, jaundiced without asterixis. Chest cta. Heart rrr,  MARIELLE. Abd non tender. Ext no c/c/e.    MOCA (11/15/21)     LAB (4/10/23) Ammonia 123->79->60, Na 133, K 4.4, Alb 2.6, Mg 1.74    A/P:  # Weakness: SNF PT/OT.  # Chest pain: angina vs GERD vs anxiety -- check ekg, echo, cxr and consult cardiology Dr Mariano. Start PPI daily (pantoprazole), add zofran daily and start ativan 0.5 mg q6h prn.  # Recent SBP: s/p paracentesis. Treated with rocephin and to complete 2 week cipro course.  # Hepatic encephalopathy and Liver cirrhosis and cancer on immunotherapy: prn bursts of neomycin 1500 mg q6 hours for 5-6 days effective at lowering ammonia when he refuses lactulose. Continue lactulose 30 ml BID -- ok to hold dose after 3rd BM of the day. Rifamixin coverage denied by insurance. Cholestyramine 4gm TID.  # ARF d/t chemo: f/u oncology.  # CHF: euvolemic OFF spironolactone, agree with holding Torsemide following recent 10# wt loss since admission.  # DM: stable OFF glipizide. Family refuses metformin.  # Hypothyroidism: 175 mcg LT4  # Pain mgmt: oxycodone 10 mg q 6 h prn pain. Ibuprofen. No tylenol.  # HTN: stable OFF coreg.  # Depression and anxiety: appreciate Psych 360 help adjusting Welbutrin  mg daily. Mirtazepine 45 mg daily.      Electronically Signed By: Geovany Vera MD   23  4:30 PM

## 2023-06-05 NOTE — LETTER
Patient: Ryan Slater  : 1955    Encounter Date: 2023    Resident seen 23 -- MP    CC: SNF ([place]) Recheck    : 1955  Admit H&P done 2021 ([place]), 3/25/2023 ([place]/[facility])  Allergy: Flexeril (intolerance)  DNR-CCA    S: 69 yo man with HFpEF, DM, Depression/anxiety, liver cancer on immunotherapy, with recent SBP. BS low and complains of double vision. BP up since off Coreg. Med List & Problem List reviewed.    O: VSS AFEB Wt 260# (up 2# ranging from 258-262 over past week) Awake, alert, pleasantly confused, NAD off O2, jaundice slightly improved. Chest cta. Heart rrr,  MARIELLE. Abd non tender. Ext no c/c/e.    MOCA (11/15/21)     LAB (23) Gluc 36, Cr 4.55, Alb 2.2, Mg 1.84, Na 138, Hgb 8.9  (4/10/23) Ammonia 123->79->60    A/P:  # Weakness: SNF PT/OT.  # Chest pain: angina vs GERD vs anxiety -- check ekg SR with old inferior infarct, echo with normal EF, cxr -- reports to be sent with patient to in persont consult w/ cardiology Dr Mariano. Tolerating PPI daily (pantoprazole), zofran daily and ativan 0.5 mg q6h prn.  # Recent SBP: s/p paracentesis. Treated with rocephin and to complete 2 week cipro course through 23.  # Hepatic encephalopathy and Liver cirrhosis and cancer on immunotherapy: prn bursts of neomycin 1500 mg q6 hours for 5-6 days effective at lowering ammonia when he refuses lactulose. Continue lactulose 30 ml BID -- ok to hold dose after 3rd BM of the day. Rifamixin coverage denied by insurance. Cholestyramine 4gm TID.  # ARF d/t chemo: f/u oncology.  # CHF: euvolemic OFF spironolactone, and holding torsemide except for sob/abdominal pain from increased ascites.  # DM: DC glipizide 23. Family refuses metformin.  # Hypothyroidism: 175 mcg LT4  # Pain mgmt: oxycodone 10 mg q 6 h prn pain. Ibuprofen. No tylenol.  # HTN: RESUME coreg 3/125 bid 23.  # Depression and anxiety: appreciate Psych 360 help adjusting Welbutrin  mg daily. Mirtazepine 45 mg  daily.      Electronically Signed By: Geovany Vera MD   6/5/23  4:30 PM

## 2023-06-05 NOTE — PROGRESS NOTES
Resident seen 23 -- MP    CC: SNF ([place]) Recheck    : 1955  Admit H&P done 2021 ([place]), 3/25/2023 ([place]/[facility])  Allergy: Flexeril (intolerance)  DNR-CCA    S: 67 yo man with HFpEF, DM, Depression/anxiety, liver cancer on immunotherapy, with recent SBP. No sob but interval CP -- improved after NTG but NOT immediately. Med List & Problem List reviewed.    O: VSS AFEB Wt 258# (down 7#) Awake, alert, pleasantly confused, NAD off O2, jaundiced without asterixis. Chest cta. Heart rrr, 2/6 MARIELLE. Abd non tender. Ext no c/c/e.    MOCA (11/15/21)     LAB (4/10/23) Ammonia 123->79->60, Na 133, K 4.4, Alb 2.6, Mg 1.74    A/P:  # Weakness: SNF PT/OT.  # Chest pain: angina vs GERD vs anxiety -- check ekg, echo, cxr and consult cardiology Dr Mariano. Start PPI daily (pantoprazole), add zofran daily and start ativan 0.5 mg q6h prn.  # Recent SBP: s/p paracentesis. Treated with rocephin and to complete 2 week cipro course.  # Hepatic encephalopathy and Liver cirrhosis and cancer on immunotherapy: prn bursts of neomycin 1500 mg q6 hours for 5-6 days effective at lowering ammonia when he refuses lactulose. Continue lactulose 30 ml BID -- ok to hold dose after 3rd BM of the day. Rifamixin coverage denied by insurance. Cholestyramine 4gm TID.  # ARF d/t chemo: f/u oncology.  # CHF: euvolemic OFF spironolactone, agree with holding Torsemide following recent 10# wt loss since admission.  # DM: stable OFF glipizide. Family refuses metformin.  # Hypothyroidism: 175 mcg LT4  # Pain mgmt: oxycodone 10 mg q 6 h prn pain. Ibuprofen. No tylenol.  # HTN: stable OFF coreg.  # Depression and anxiety: appreciate Psych 360 help adjusting Welbutrin  mg daily. Mirtazepine 45 mg daily.

## 2023-06-05 NOTE — PROGRESS NOTES
Resident seen 23 -- MP    CC: SNF ([place]) Recheck    : 1955  Admit H&P done 2021 ([place]), 3/25/2023 ([place]/[facility])  Allergy: Flexeril (intolerance)  DNR-CCA    S: 69 yo man with HFpEF, DM, Depression/anxiety, liver cancer on immunotherapy, with recent SBP. BS low and complains of double vision. BP up since off Coreg. Med List & Problem List reviewed.    O: VSS AFEB Wt 260# (up 2# ranging from 258-262 over past week) Awake, alert, pleasantly confused, NAD off O2, jaundice slightly improved. Chest cta. Heart rrr,  MARIELLE. Abd non tender. Ext no c/c/e.    MOCA (11/15/21)     LAB (23) Gluc 36, Cr 4.55, Alb 2.2, Mg 1.84, Na 138, Hgb 8.9  (4/10/23) Ammonia 123->79->60    A/P:  # Weakness: SNF PT/OT.  # Chest pain: angina vs GERD vs anxiety -- check ekg SR with old inferior infarct, echo with normal EF, cxr -- reports to be sent with patient to in persont consult w/ cardiology Dr Mariano. Tolerating PPI daily (pantoprazole), zofran daily and ativan 0.5 mg q6h prn.  # Recent SBP: s/p paracentesis. Treated with rocephin and to complete 2 week cipro course through 23.  # Hepatic encephalopathy and Liver cirrhosis and cancer on immunotherapy: prn bursts of neomycin 1500 mg q6 hours for 5-6 days effective at lowering ammonia when he refuses lactulose. Continue lactulose 30 ml BID -- ok to hold dose after 3rd BM of the day. Rifamixin coverage denied by insurance. Cholestyramine 4gm TID.  # ARF d/t chemo: f/u oncology.  # CHF: euvolemic OFF spironolactone, and holding torsemide except for sob/abdominal pain from increased ascites.  # DM: DC glipizide 23. Family refuses metformin.  # Hypothyroidism: 175 mcg LT4  # Pain mgmt: oxycodone 10 mg q 6 h prn pain. Ibuprofen. No tylenol.  # HTN: RESUME coreg 3/125 bid 23.  # Depression and anxiety: appreciate Psych 360 help adjusting Welbutrin  mg daily. Mirtazepine 45 mg daily.

## 2023-06-06 NOTE — PROGRESS NOTES
*Provider Impression*  Patient is a 68 year old male who is seen today for management of multiple medical problems  #Weakness / OA / Lumbar stenosis - PT/OT, turmeric 2500mg daily, oxycodone 10mg q6h PRN, acetaminophen 650mg q4h PRN  #CHF / HLD - cholestyramine 4grams daily, check echocardiogram, hold torsemide x2 then 10mg daily, check CBC w/ diff and CMP  #Cirrhosis / Hepatic encephalopathy / SBP -  cipro 250mg daily until 6/7, lactulose 20g/30mL BID - hold for >3BM/day, strict I&O, daily wts,   #RAYMOND - CPAP  #T2DM - glipizide 5mg BID  #Hypothyroidism - levothyroxine 175mcg daily  #GERD / Nausea - zofran 8mg q8h PRN  #Depression - bupropion XL 300mg / 150mg daily, mirtazapine 45mg daily  Follow up as needed      *Chief Complaint*  cirrhosis      *History of Present Illness*  Pt is a 69 y/o male w/ PMH as below who presented to the hospital because of confusion, generalized weakness, malaise, and anorexia of a few days duration. He was admitted for acute metabolic/hepatic encephalopathy.  GI followed and recommended oral lactulose 4 times daily to titrate to 3-4 bowel movements a day.  He was placed on rifaximin during the hospitalization however there is reported that the insurance denied to cover the medication after multiple attempts by the gastroenterology team.  Rifaximin was discontinued on discharge. A paracentesis was consistent with spontaneous bacterial peritonitis.  The patient was treated with Rocephin during the hospitalization and was transitioned to Cipro to complete a 2-week course. He was consulted due to acute renal failure.  After evaluation it was felt the acute renal failure is related to his chemotherapy.  The nephrologist advised that there is no need for nephrology follow-up as an outpatient however the patient should follow-up with his oncologist. He was then d/c to [facility] for rehab.    Nursing staff report he had an episode of chest pain. His labs appreciated w/ worsening renal  function, lytes stable, weights are down quite a bit - 12lbs in 4 days.    He is seen sitting up in his room today and reports had an episode of chest pain a week ago, and again today, both of which were brief, non exertional, everything was good, last night between 11:30 and 12:00 got nitro with little to no effect and it resolved w/ GI cocktail. He denies any melena, BRBPR, light yellow stools, having chest tightness, some RAMSEY, but no other new c/o presently.      Allergies - flexeril  PMH - HCC, nonalcoholic liver cirrhosis, combined heart failure, hypothyroidism, DM-type II, HTN, lumbar stenosis, chronic thrombocytopenia, gastric varices, GERD, hypothyroidism, RAYMOND, OA  PSH - TAVR, back surgery, hand surgery, hip replacement, shoulder surgery, tonsillectomy, TENS  FH - heart disease, RAYMOND, cancer  SocHx - Never smoker, No EtOH      *Review of Systems*  All other systems reviewed are negative except as noted in the HPI     *Vitals*  Date: 5/31/23 - T: 97.9 P: 84 R: 18 BP: 125/61 SpO2: 94% ; Date: 5/31/23 - Wt: 258.5     *Results/Data*  CBC - Date: 5/31/23 WBC: 7.7 HGB: 8.3 HCT: 26.4 PLT: 75 ;   BMP - Date: 5/31/23 Na: 135 K: 4.2 Cl: 106 CO2: 18 BUN: 80 Cr: 4.56 Glu: 58 Ca: 7.9 ;   LFT - Date: 5/31/23 AST: 80 ALT: 44 ALP: 230 Tbili: 6.4 ;   Coags - Date: INR: PT:   Other - Date: 9/7/22 Ammonia: 124; 9/14/22 - Ammonia: 98 TSH: 2.95 AFP: 5; 9/23/22 Ammonia: 75 ; 3/29/23 - Ammonia: 123; 3/31/23 - Ammonia: 79; 5/24/23 - Ammonia: 84    *Physical Exam*  Gen: (+) NAD, (+) well-appearing  HEENT: (+) normocephalic, (+) MMM  Neck: (+) supple  Lungs: (+) CTAB, (-) wheezes, (-) rales, (-) rhonchi  Heart: (+) RRR, (+) S1 S2, (-) murmurs  Pulses: (+) palpable  Abd: (+) soft, (+) NT, (+) ND, (+) BS+  Ext: (-) edema, (-) deformity  MSK: (-) joint swelling  Skin: (+) warm, (+) dry, (-) rash  Neuro: (+) follows commands, (-) tremor, (+) alert

## 2023-06-13 NOTE — PROGRESS NOTES
*Provider Impression*  Patient is a 68 year old male who is seen today for management of multiple medical problems  #Weakness / OA / Lumbar stenosis - PT/OT, turmeric 2500mg daily, oxycodone 10mg q6h PRN, acetaminophen 650mg q4h PRN  #CHF / HLD - cholestyramine 4grams daily, torsemide 10mg daily PRN - add parameter to give if wt >260, check CBC w/ diff and CMP  #Cirrhosis / Hepatic encephalopathy / SBP -   lactulose 20g/30mL BID - hold for >3BM/day, strict I&O, daily wts, schedule IR paracentesis, check abd US  #RAYMOND - CPAP  #T2DM - glipizide 5mg BID  #Hypothyroidism - levothyroxine 175mcg daily  #GERD / Nausea - addendum: schedule zofran 8mg q8h  #Depression - bupropion XL 300mg / 150mg daily, mirtazapine 45mg daily  Follow up as needed      *Chief Complaint*  cirrhosis      *History of Present Illness*  Pt is a 69 y/o male w/ PMH as below who presented to the hospital because of confusion, generalized weakness, malaise, and anorexia of a few days duration. He was admitted for acute metabolic/hepatic encephalopathy.  GI followed and recommended oral lactulose 4 times daily to titrate to 3-4 bowel movements a day.  He was placed on rifaximin during the hospitalization however there is reported that the insurance denied to cover the medication after multiple attempts by the gastroenterology team.  Rifaximin was discontinued on discharge. A paracentesis was consistent with spontaneous bacterial peritonitis.  The patient was treated with Rocephin during the hospitalization and was transitioned to Cipro to complete a 2-week course. He was consulted due to acute renal failure.  After evaluation it was felt the acute renal failure is related to his chemotherapy.  The nephrologist advised that there is no need for nephrology follow-up as an outpatient however the patient should follow-up with his oncologist. He was then d/c to [facility] for rehab.    Her reportedly had some nausea and was added routine zofran.    He is  seen today visiting with his wife and reports the zofran helped w/ nausea, feels fullness all the time, litltle cough, no CP, SOB, constipation, diarrhea, or any other new c/o presently.     (Addendum: 6/9 - he saw Marianne Gonzalez, note appreciated - they are also recommending setting up IR paracentesis. Per nursing staff he had a nosebleed, and is more lethargic. He is seen with his wife. [place]ix[place], not disoriented, just tired, and he is talking normally. Per his wife these are all good signs and neither of them feel he needs to be sent out to the hospital at this point, but are in agreement w/ STAT labs. Possibly adding neomycin as rifaximin is not covered by his insurance. STAT labs drawn and appreciated w/ critical ammonia. D/w Dr. Vera and increased lactulose and added neomycin w/ arrangements being made for IR paracentesis either over the weekend or early next week. 6/10 - he was sent out to the ED when nursing staff were unable to wake him.       Allergies - flexeril  PMH - HCC, nonalcoholic liver cirrhosis, combined heart failure, hypothyroidism, DM-type II, HTN, lumbar stenosis, chronic thrombocytopenia, gastric varices, GERD, hypothyroidism, RAYMOND, OA  PSH - TAVR, back surgery, hand surgery, hip replacement, shoulder surgery, tonsillectomy, TENS  FH - heart disease, RAYMOND, cancer  SocHx - Never smoker, No EtOH      *Review of Systems*  All other systems reviewed are negative except as noted in the HPI     *Vitals*  Date: 6/7/23 - T: 97.8 P: 69 R: 18 BP: 110/60 SpO2: 96% ; Date: 6/7/23 - Wt: 264     *Results/Data*  CBC - Date: 6/5/23 WBC: 7.2 HGB: 8.9 HCT: 28.3 PLT: 62 ;   BMP - Date: 6/5/23 Na: 138 K: 5.0 Cl: 111 CO2: 14 BUN: 74 Cr: 4.55 Glu: 36 Ca: 8.3 ;   LFT - Date: 6/5/23 AST: 88 ALT: 43 ALP: 197 Tbili: 7.0 ;   Coags - Date: INR: PT:   Other - Date: 9/7/22 Ammonia: 124; 9/14/22 - Ammonia: 98 TSH: 2.95 AFP: 5; 9/23/22 Ammonia: 75 ; 3/29/23 - Ammonia: 123; 3/31/23 - Ammonia: 79; 5/24/23 - Ammonia:  84    *Physical Exam*  Gen: (+) NAD, (+) well-appearing  HEENT: (+) normocephalic, (+) MMM  Neck: (+) supple  Lungs: (+) CTAB, (-) wheezes, (-) rales, (-) rhonchi  Heart: (+) RRR, (+) S1 S2, (-) murmurs  Pulses: (+) palpable  Abd: (+) soft, (+) NT, (+) ascites, (+) BS+  Ext: (-) edema, (-) deformity  MSK: (-) joint swelling  Skin: (+) warm, (+) dry, (-) rash, (+) jaundice  Neuro: (+) follows commands, (-) tremor, (-) alert

## 2023-06-16 PROBLEM — E87.29 HIGH ANION GAP METABOLIC ACIDOSIS: Status: ACTIVE | Noted: 2023-01-01

## 2023-06-16 PROBLEM — A41.9 SEPSIS (MULTI): Status: ACTIVE | Noted: 2023-01-01

## 2023-06-16 PROBLEM — D64.9 ANEMIA: Status: ACTIVE | Noted: 2023-01-01

## 2023-06-16 PROBLEM — K43.2 RECURRENT VENTRAL HERNIA: Status: ACTIVE | Noted: 2023-01-01

## 2023-06-16 PROBLEM — E87.1 HYPONATREMIA: Status: ACTIVE | Noted: 2023-01-01

## 2023-06-16 PROBLEM — G93.40 ENCEPHALOPATHY: Status: ACTIVE | Noted: 2023-01-01

## 2023-06-16 PROBLEM — N17.9 AKI (ACUTE KIDNEY INJURY) (CMS-HCC): Status: ACTIVE | Noted: 2023-01-01

## 2023-06-16 PROBLEM — K76.7: Status: ACTIVE | Noted: 2023-01-01
